# Patient Record
Sex: FEMALE | Race: BLACK OR AFRICAN AMERICAN | Employment: OTHER | URBAN - METROPOLITAN AREA
[De-identification: names, ages, dates, MRNs, and addresses within clinical notes are randomized per-mention and may not be internally consistent; named-entity substitution may affect disease eponyms.]

---

## 2017-04-12 ENCOUNTER — OFFICE VISIT (OUTPATIENT)
Dept: INTERNAL MEDICINE CLINIC | Age: 42
End: 2017-04-12

## 2017-04-12 VITALS
HEIGHT: 67 IN | SYSTOLIC BLOOD PRESSURE: 110 MMHG | WEIGHT: 188 LBS | HEART RATE: 80 BPM | RESPIRATION RATE: 16 BRPM | DIASTOLIC BLOOD PRESSURE: 82 MMHG | BODY MASS INDEX: 29.51 KG/M2

## 2017-04-12 DIAGNOSIS — F41.9 ANXIETY: Primary | ICD-10-CM

## 2017-04-12 DIAGNOSIS — R07.89 CHEST WALL PAIN: ICD-10-CM

## 2017-04-12 DIAGNOSIS — K59.00 CONSTIPATION, UNSPECIFIED CONSTIPATION TYPE: ICD-10-CM

## 2017-04-12 DIAGNOSIS — F32.9 REACTIVE DEPRESSION: ICD-10-CM

## 2017-04-12 DIAGNOSIS — D64.9 ANEMIA, UNSPECIFIED TYPE: ICD-10-CM

## 2017-04-12 LAB
BASOPHILS ABSOLUTE: 0 K/UL (ref 0–0.2)
BASOPHILS RELATIVE PERCENT: 0.8 %
EOSINOPHILS ABSOLUTE: 0.4 K/UL (ref 0–0.6)
EOSINOPHILS RELATIVE PERCENT: 6.9 %
HCT VFR BLD CALC: 31.1 % (ref 36–48)
HEMOGLOBIN: 9.6 G/DL (ref 12–16)
LYMPHOCYTES ABSOLUTE: 1.8 K/UL (ref 1–5.1)
LYMPHOCYTES RELATIVE PERCENT: 33.3 %
MCH RBC QN AUTO: 27 PG (ref 26–34)
MCHC RBC AUTO-ENTMCNC: 31 G/DL (ref 31–36)
MCV RBC AUTO: 87.2 FL (ref 80–100)
MONOCYTES ABSOLUTE: 0.4 K/UL (ref 0–1.3)
MONOCYTES RELATIVE PERCENT: 6.5 %
NEUTROPHILS ABSOLUTE: 2.9 K/UL (ref 1.7–7.7)
NEUTROPHILS RELATIVE PERCENT: 52.5 %
PDW BLD-RTO: 15.2 % (ref 12.4–15.4)
PLATELET # BLD: 276 K/UL (ref 135–450)
PMV BLD AUTO: 8.5 FL (ref 5–10.5)
RBC # BLD: 3.56 M/UL (ref 4–5.2)
WBC # BLD: 5.5 K/UL (ref 4–11)

## 2017-04-12 PROCEDURE — 99214 OFFICE O/P EST MOD 30 MIN: CPT | Performed by: INTERNAL MEDICINE

## 2017-04-12 PROCEDURE — 36415 COLL VENOUS BLD VENIPUNCTURE: CPT | Performed by: INTERNAL MEDICINE

## 2017-04-12 RX ORDER — OXYCODONE HYDROCHLORIDE AND ACETAMINOPHEN 5; 325 MG/1; MG/1
TABLET ORAL
COMMUNITY
Start: 2017-03-17 | End: 2017-04-16 | Stop reason: ALTCHOICE

## 2017-04-12 RX ORDER — MELOXICAM 15 MG/1
15 TABLET ORAL DAILY
Qty: 30 TABLET | Refills: 5 | Status: SHIPPED | OUTPATIENT
Start: 2017-04-12 | End: 2017-08-15

## 2017-04-12 ASSESSMENT — ENCOUNTER SYMPTOMS
COUGH: 0
ABDOMINAL DISTENTION: 0
CONSTIPATION: 0
ABDOMINAL PAIN: 0
SHORTNESS OF BREATH: 0

## 2017-04-13 ENCOUNTER — TELEPHONE (OUTPATIENT)
Dept: INTERNAL MEDICINE CLINIC | Age: 42
End: 2017-04-13

## 2017-04-13 DIAGNOSIS — D64.9 ANEMIA, UNSPECIFIED TYPE: Primary | ICD-10-CM

## 2017-04-17 ENCOUNTER — TELEPHONE (OUTPATIENT)
Dept: INTERNAL MEDICINE CLINIC | Age: 42
End: 2017-04-17

## 2017-04-17 DIAGNOSIS — G89.29 OTHER CHRONIC PAIN: Primary | ICD-10-CM

## 2017-04-20 ENCOUNTER — NURSE ONLY (OUTPATIENT)
Dept: INTERNAL MEDICINE CLINIC | Age: 42
End: 2017-04-20

## 2017-04-20 DIAGNOSIS — D64.9 ANEMIA, UNSPECIFIED TYPE: ICD-10-CM

## 2017-04-20 LAB
IRON SATURATION: 10 % (ref 15–50)
IRON: 44 UG/DL (ref 37–145)
TOTAL IRON BINDING CAPACITY: 437 UG/DL (ref 260–445)

## 2017-04-20 PROCEDURE — 36415 COLL VENOUS BLD VENIPUNCTURE: CPT | Performed by: INTERNAL MEDICINE

## 2017-04-26 ENCOUNTER — TELEPHONE (OUTPATIENT)
Dept: INTERNAL MEDICINE CLINIC | Age: 42
End: 2017-04-26

## 2017-06-20 RX ORDER — POLYETHYLENE GLYCOL 3350 17 G/17G
POWDER, FOR SOLUTION ORAL
Qty: 1 BOTTLE | Refills: 3 | Status: SHIPPED | OUTPATIENT
Start: 2017-06-20

## 2017-07-06 ENCOUNTER — TELEPHONE (OUTPATIENT)
Dept: INTERNAL MEDICINE CLINIC | Age: 42
End: 2017-07-06

## 2017-07-19 ENCOUNTER — NURSE ONLY (OUTPATIENT)
Dept: INTERNAL MEDICINE CLINIC | Age: 42
End: 2017-07-19

## 2017-07-19 DIAGNOSIS — D64.9 ANEMIA, UNSPECIFIED TYPE: Primary | ICD-10-CM

## 2017-07-19 LAB
HEMOCCULT STL QL: NORMAL

## 2017-07-19 PROCEDURE — 99999 PR OFFICE/OUTPT VISIT,PROCEDURE ONLY: CPT | Performed by: INTERNAL MEDICINE

## 2017-08-11 RX ORDER — VENLAFAXINE HYDROCHLORIDE 75 MG/1
CAPSULE, EXTENDED RELEASE ORAL
Qty: 30 CAPSULE | Refills: 5 | Status: SHIPPED | OUTPATIENT
Start: 2017-08-11 | End: 2017-12-07 | Stop reason: SDUPTHER

## 2017-08-22 ENCOUNTER — OFFICE VISIT (OUTPATIENT)
Dept: RHEUMATOLOGY | Age: 42
End: 2017-08-22

## 2017-08-22 VITALS
HEART RATE: 70 BPM | WEIGHT: 189 LBS | SYSTOLIC BLOOD PRESSURE: 110 MMHG | DIASTOLIC BLOOD PRESSURE: 80 MMHG | HEIGHT: 67 IN | BODY MASS INDEX: 29.66 KG/M2 | OXYGEN SATURATION: 90 %

## 2017-08-22 DIAGNOSIS — M79.7 FIBROMYALGIA: ICD-10-CM

## 2017-08-22 DIAGNOSIS — M25.50 POLYARTHRALGIA: ICD-10-CM

## 2017-08-22 DIAGNOSIS — M79.7 FIBROMYALGIA: Primary | ICD-10-CM

## 2017-08-22 DIAGNOSIS — E55.9 VITAMIN D DEFICIENCY: Primary | ICD-10-CM

## 2017-08-22 LAB
C-REACTIVE PROTEIN: 2.1 MG/L (ref 0–5.1)
RHEUMATOID FACTOR: <10 IU/ML
SEDIMENTATION RATE, ERYTHROCYTE: 15 MM/HR (ref 0–20)
TOTAL CK: 266 U/L (ref 26–192)
TSH REFLEX FT4: 2.04 UIU/ML (ref 0.27–4.2)
VITAMIN B-12: 1240 PG/ML (ref 211–911)
VITAMIN D 25-HYDROXY: 28.5 NG/ML

## 2017-08-22 PROCEDURE — 99245 OFF/OP CONSLTJ NEW/EST HI 55: CPT | Performed by: INTERNAL MEDICINE

## 2017-08-22 RX ORDER — MELOXICAM 15 MG/1
TABLET ORAL
COMMUNITY
Start: 2016-09-16 | End: 2017-08-22

## 2017-08-22 RX ORDER — METHOCARBAMOL 500 MG/1
500 TABLET, FILM COATED ORAL 3 TIMES DAILY PRN
Qty: 90 TABLET | Refills: 1 | Status: SHIPPED | OUTPATIENT
Start: 2017-08-22 | End: 2017-11-27 | Stop reason: SDUPTHER

## 2017-08-22 RX ORDER — NAPROXEN 500 MG/1
500 TABLET ORAL 2 TIMES DAILY PRN
Qty: 60 TABLET | Refills: 3 | Status: SHIPPED | OUTPATIENT
Start: 2017-08-22 | End: 2018-01-26 | Stop reason: SDUPTHER

## 2017-08-23 RX ORDER — CHOLECALCIFEROL (VITAMIN D3) 50 MCG
2000 TABLET ORAL DAILY
Qty: 30 TABLET | Refills: 11 | Status: SHIPPED | OUTPATIENT
Start: 2017-08-23 | End: 2018-04-12

## 2017-08-23 RX ORDER — ERGOCALCIFEROL 1.25 MG/1
50000 CAPSULE ORAL WEEKLY
Qty: 4 CAPSULE | Refills: 0 | Status: SHIPPED | OUTPATIENT
Start: 2017-08-23 | End: 2018-04-12

## 2017-08-24 LAB
ANA BY IFA: NORMAL
CCP IGG ANTIBODIES: 6 UNITS (ref 0–19)

## 2017-10-03 ENCOUNTER — OFFICE VISIT (OUTPATIENT)
Dept: RHEUMATOLOGY | Age: 42
End: 2017-10-03

## 2017-10-03 VITALS
SYSTOLIC BLOOD PRESSURE: 120 MMHG | BODY MASS INDEX: 28.82 KG/M2 | DIASTOLIC BLOOD PRESSURE: 80 MMHG | HEIGHT: 67 IN | WEIGHT: 183.6 LBS

## 2017-10-03 DIAGNOSIS — M25.50 POLYARTHRALGIA: ICD-10-CM

## 2017-10-03 DIAGNOSIS — M79.7 FIBROMYALGIA: Primary | ICD-10-CM

## 2017-10-03 PROCEDURE — 99214 OFFICE O/P EST MOD 30 MIN: CPT | Performed by: INTERNAL MEDICINE

## 2017-10-03 RX ORDER — GABAPENTIN 300 MG/1
CAPSULE ORAL
Qty: 90 CAPSULE | Refills: 3 | Status: SHIPPED | OUTPATIENT
Start: 2017-10-03 | End: 2017-12-12

## 2017-10-03 NOTE — MR AVS SNAPSHOT
After Visit Summary             Vandana Bowman   10/3/2017 11:40 AM   Office Visit    Description:  Female : 1975   Provider:  [place], [place]   Department:  [facility] Rheumatology              Your Follow-Up and Future Appointments         Below is a list of your follow-up and future appointments. This may not be a complete list as you may have made appointments directly with providers that we are not aware of or your providers may have made some for you. Please call your providers to confirm appointments. It is important to keep your appointments. Please bring your current insurance card, photo ID, co-pay, and all medication bottles to your appointment. If self-pay, payment is expected at the time of service. Your To-Do List     Follow-Up    Return in about 3 months (around 1/3/2018). Information from Your Visit        Department     Name Address Phone Fax    [facility]umatology [facility] [phone] [phone]      You Were Seen for:         Comments    Fibromyalgia   [[id number]]         Vital Signs     Blood Pressure Height Weight Body Mass Index Smoking Status       120/80 5' 6.5\" (1.689 m) 183 lb 9.6 oz (83.3 kg) 29.19 kg/m2 Former Smoker       Additional Information about your Body Mass Index (BMI)           Your BMI as listed above is considered overweight (25.0-29.9). BMI is an estimate of body fat, calculated from your height and weight. The higher your BMI, the greater your risk of heart disease, high blood pressure, type 2 diabetes, stroke, gallstones, arthritis, sleep apnea, and certain cancers. BMI is not perfect. It may overestimate body fat in athletes and people who are more muscular. If your body fat is high you can improve your BMI by decreasing your calorie intake and becoming more physically active.      Learn more at: [email or web address]          Instructions -     gabapentin (NEURONTIN) 300 MG capsule; Take 300 mg daily by mouth at bedtime for 4 days, then twice a day for 4 days and then 3 times a day    Strongly emphasized on low impact aerobic and stretching exercises including biking, swimming, walking, yoga or tiachi classes    Counseled on Sleep hygiene    Advised to drink 64 oz of water and limit caffeine intake to 8 oz/day   -Massage therapy                Today's Medication Changes          These changes are accurate as of: 10/3/17 12:11 PM.  If you have any questions, ask your nurse or doctor. START taking these medications           gabapentin 300 MG capsule   Commonly known as:  NEURONTIN   Instructions:   Take 300 mg daily by mouth at bedtime for 4 days, then twice a day for 4 days and then 3 times a day   Quantity:  90 capsule   Refills:  3   Started by:  Alen Garces MD            Where to Get Your Medications      These medications were sent to [address] [place] [address] [facility] [phone]  [address] [place] [address], [address] [place] [address]     Phone:  [phone]     gabapentin 300 MG capsule               Your Current Medications Are              gabapentin (NEURONTIN) 300 MG capsule Take 300 mg daily by mouth at bedtime for 4 days, then twice a day for 4 days and then 3 times a day    vitamin D (ERGOCALCIFEROL) 50250 units CAPS capsule Take 1 capsule by mouth once a week    Cholecalciferol (VITAMIN D) 2000 units TABS tablet Take 1 tablet by mouth daily After finishing 4 week couse    naproxen (NAPROSYN) 500 MG tablet Take 1 tablet by mouth 2 times daily as needed for Pain    venlafaxine (EFFEXOR XR) 75 MG extended release capsule TAKE ONE CAPSULE BY MOUTH ONCE DAILY    polyethylene glycol (GLYCOLAX) powder MIX 17 GRAMS (1 CAPFUL) WITH 8 OZ OF FAVORITE LIQUID AND TAKE  BY MOUTH DAILY    Ascorbic Acid (VITAMIN C) 500 MG tablet Take 500 mg by mouth daily

## 2017-10-03 NOTE — PROGRESS NOTES
ER multiple times for the same symptoms. Pain wakes her up frequently from sleep. Sleep is not refreshing. She has persistent fatigue, chronic headaches, brain fog, anxiety and depression. She also has pain in her joints associated with stiffness. She denies any swelling. She has difficulty opening doorknobs and jar cans. She also has pain on bottom of her feet which is worse after taking a step in the morning. She recently went to the ER and was prescribed Robaxin and naproxen which has helped alleviate some of the symptoms. She has never tried gabapentin, Elavil, Lyrica, Cymbalta or Savella. She has never tried any nonpharmacological therapy for her symptoms. Interim History: She presents for a follow-up of fibromyalgia. She continues to complain of chronic widespread musculoskeletal pain. She is on Robaxin and naproxen as needed with some improvement in symptoms. She was on Effexor in the past which helped initially then later on last its efficacy. Workup for autoimmune diseases negative. She has vitamin D deficiency and is on replacement. Blood work was reviewed with the patient. HPI  ROS    REVIEW OF SYSTEMS: positive for fatigue, weakness, chest pain, shortness of breath, swollen legs, constipation, muscle spasm, anxiety, agitation, difficulty staying sleep, anemia  Constitutional: No unanticipated weight loss or fevers. Integumentary: No rash, photosensitivity, malar rash, livedo reticularis, alopecia and Raynaud's symptoms, sclerodactyly, skin tightening  Eyes: negative for dry eyes, visual disturbance and persistent redness, discharge from eyes   ENT: - No tinnitus, loss of hearing, vertigo, or recurrent ear infections.  - No history of nasal/oral ulcers. - No history of sicca symptoms. Cardiovascular: No history of pericarditis, or murmur or palpitations  Respiratory: No cough or history of interstitial lung disease. No history of pleurisy.  No history of tuberculosis or atypical Musculoskeletal:                                            Right            Left                                   Tender Tender    Costochondral  + +   Low cervical + +   suboccipital + +   Trapezius  + +   Supraspinatus  + +   Lateral epicondyl + +   Gluteal + +   Greater trochanter  + +   knees + +     Tenderness to palpation in multiple PIPs, MCPs, wrists, elbows, shoulders, knees, ankles, MTPs  Ambulates without assistance, normal gait  Neck: Full ROM, no tenderness, supple   Upper Extremities        Shoulder: Full ROM, no crepitus        Elbow:  Full ROM, no synovitis, no deformity        Wrist: Full ROM, no synovitis, no deformity, no ulnar deviation        Fingers: No sclerodactly, no active raynaud's, no ulcers. MCPs: No synovitis, no deformity             PIPs:  No synovitis, no deformity             DIPs: No synovitis, no deformity             Nails: No pitting, no telangiectasias. Lower Extremities        Hip: Full ROM, no tenderness to palpation        Knee: Full ROM, no erythema/swelling/laxity/crepitus. Patella not ballotable. Ankle: Full ROM, no swelling or erythema        MTPs: No swelling or erythema  Back- no tenderness. Eyes:  PERRL, extra ocular movements intact, conjunctiva normal   HEENT:  Atraumatic, normocephalic, external ears normal, oropharynx moist, no pharyngeal exudates. Respiratory:  No respiratory distress  GI:  Soft, nondistended, normal bowel sounds, nontender, no organomegaly, no mass, no rebound, no guarding   :  No costovertebral angle tenderness   Integument:  Well hydrated, no rash or telangiectasias  Lymphatic:  No lymphadenopathy noted   Neurologic:   Alert & oriented x 3, CN 2-12 normal, no focal deficits noted. Sensations Intact. Muscle strength 5/5 proximally and distally in upper and lower extremities.    Psychiatric:  Speech and behavior appropriate           LABS AND IMAGING  Outside data reviewed and in HPI    Lab Results   Component Value Date    WBC 5.0 08/15/2017    RBC 4.21 08/15/2017    HGB 12.9 08/15/2017    HCT 38.5 08/15/2017     08/15/2017    MCV 91.5 08/15/2017    MCH 30.7 08/15/2017    MCHC 33.6 08/15/2017    RDW 14.4 08/15/2017    SEGSPCT 31.0 01/03/2013    LYMPHOPCT 30.6 08/15/2017    MONOPCT 5.9 08/15/2017    EOSPCT 3.1 11/20/2010    BASOPCT 1.0 08/15/2017    MONOSABS 0.3 08/15/2017    LYMPHSABS 1.5 08/15/2017    EOSABS 0.4 08/15/2017    BASOSABS 0.0 08/15/2017    DIFFTYPE Manual 01/03/2013       Chemistry        Component Value Date/Time     08/15/2017 1326    K 3.9 08/15/2017 1326     08/15/2017 1326    CO2 24 08/15/2017 1326    BUN 13 08/15/2017 1326    CREATININE 1.0 08/15/2017 1326        Component Value Date/Time    CALCIUM 9.3 08/15/2017 1326    ALKPHOS 46 08/15/2017 1326    AST 16 08/15/2017 1326    ALT 13 08/15/2017 1326    BILITOT 0.5 08/15/2017 1326          Lab Results   Component Value Date    SEDRATE 15 08/22/2017     Lab Results   Component Value Date    CRP 2.1 08/22/2017     Lab Results   Component Value Date    ANNEMARIE Negative 05/19/2015     Lab Results   Component Value Date    RF <10.0 08/22/2017    CCPABIGG 6 08/22/2017     Lab Results   Component Value Date    ANNEMARIE Negative 05/19/2015    ANAINT see below 05/19/2015       ASSESSMENT AND PLAN      Assessment/Plan:      ASSESSMENT:    1. Fibromyalgia    2. Polyarthralgia        PLAN:   Patricia Cuba was seen today for follow-up. Diagnoses and all orders for this visit:    Fibromyalgia-Continues to be symptomatic  -Workup negative for autoimmune disease/systemic inflammatory disease  -Continue Robaxin and naproxen as needed  -I will start gabapentin 300 mg per day with taper.   She is advised to take 600 mg at night if she gets drowsy with the morning and afternoon dose  -Side effects of gabapentin include drowsiness, dizziness, sedation, weight gain, dry mouth and cognitive impairment and were explained to the patient in detail  -She cannot afford aquatic therapy, not covered by the insurance  -Strongly emphasized on low impact aerobic and stretching exercises including biking, swimming, walking, yoga or tiachi classes    Counseled on Sleep hygiene    Advised to drink 64 oz of water and limit caffeine intake to 8 oz/day   -Massage therapy  -     gabapentin (NEURONTIN) 300 MG capsule; Take 300 mg daily by mouth at bedtime for 4 days, then twice a day for 4 days and then 3 times a day    Polyarthralgia-most likely secondary to fibromyalgia/chronic pain. Blood work negative for rheumatoid arthritis. Continue naproxen as needed    The patient indicates understanding of these issues and agrees with the plan. Return in about 3 months (around 1/3/2018). The risks and benefits of my recommendations, as well as other treatment options, benefits and side effects were discussed with the patient. All questions were answered. I reviewed patient's history, referral documents and electronic medical records  Copy of consult note is being routed electronically/faxed to referring physician         ######################################################################    I thank you for giving me the opportunity to participate in [facility]. If you have any questions or concerns please feel free to contact me. I look forward to following  [facility] along with you. Electronically signed by: Dejuan Stuart MD, 10/3/2017 2:36 PM    Documentation was done using voice recognition dragon software. Every effort was made to ensure accuracy; however, inadvertent unintentional computerized transcription errors may be present.

## 2017-11-21 DIAGNOSIS — R07.89 CHEST WALL PAIN: ICD-10-CM

## 2017-11-21 RX ORDER — MELOXICAM 15 MG/1
TABLET ORAL
Qty: 30 TABLET | Refills: 3 | Status: SHIPPED | OUTPATIENT
Start: 2017-11-21 | End: 2017-12-04 | Stop reason: ALTCHOICE

## 2017-11-27 DIAGNOSIS — M79.7 FIBROMYALGIA: ICD-10-CM

## 2017-11-28 RX ORDER — METHOCARBAMOL 500 MG/1
500 TABLET, FILM COATED ORAL 3 TIMES DAILY PRN
Qty: 90 TABLET | Refills: 1 | Status: SHIPPED | OUTPATIENT
Start: 2017-11-28 | End: 2018-02-08 | Stop reason: SDUPTHER

## 2017-12-05 ENCOUNTER — TELEPHONE (OUTPATIENT)
Dept: INTERNAL MEDICINE CLINIC | Age: 42
End: 2017-12-05

## 2017-12-07 ENCOUNTER — OFFICE VISIT (OUTPATIENT)
Dept: RHEUMATOLOGY | Age: 42
End: 2017-12-07

## 2017-12-07 VITALS
HEIGHT: 67 IN | DIASTOLIC BLOOD PRESSURE: 84 MMHG | BODY MASS INDEX: 28.09 KG/M2 | WEIGHT: 179 LBS | SYSTOLIC BLOOD PRESSURE: 130 MMHG

## 2017-12-07 DIAGNOSIS — M25.50 POLYARTHRALGIA: ICD-10-CM

## 2017-12-07 DIAGNOSIS — M79.7 FIBROMYALGIA: Primary | ICD-10-CM

## 2017-12-07 PROCEDURE — G8417 CALC BMI ABV UP PARAM F/U: HCPCS | Performed by: INTERNAL MEDICINE

## 2017-12-07 PROCEDURE — G8484 FLU IMMUNIZE NO ADMIN: HCPCS | Performed by: INTERNAL MEDICINE

## 2017-12-07 PROCEDURE — 1036F TOBACCO NON-USER: CPT | Performed by: INTERNAL MEDICINE

## 2017-12-07 PROCEDURE — G8427 DOCREV CUR MEDS BY ELIG CLIN: HCPCS | Performed by: INTERNAL MEDICINE

## 2017-12-07 PROCEDURE — 99214 OFFICE O/P EST MOD 30 MIN: CPT | Performed by: INTERNAL MEDICINE

## 2017-12-07 RX ORDER — PREGABALIN 75 MG/1
CAPSULE ORAL
Qty: 120 CAPSULE | Refills: 2 | Status: SHIPPED | OUTPATIENT
Start: 2017-12-07 | End: 2017-12-12

## 2017-12-07 RX ORDER — VENLAFAXINE HYDROCHLORIDE 150 MG/1
CAPSULE, EXTENDED RELEASE ORAL
Qty: 30 CAPSULE | Refills: 5 | Status: SHIPPED | OUTPATIENT
Start: 2017-12-07 | End: 2018-07-16

## 2017-12-07 NOTE — PROGRESS NOTES
2017  Patient Name: Natali Gomez  : 1975  Medical Record: [id number]    MEDICATIONS  Current Outpatient Prescriptions   Medication Sig Dispense Refill    venlafaxine (EFFEXOR XR) 150 MG extended release capsule TAKE ONE CAPSULE BY MOUTH ONCE DAILY 30 capsule 5    pregabalin (LYRICA) 75 MG capsule Take 1 tab daily at bedtime  for 1 week, then twice a day for 1 week, then 1 tab in am and 2 tabs in pm for 1 week, then 2 tabs in am and 2 tabs in pm daily. 120 capsule 2    methocarbamol (ROBAXIN) 500 MG tablet Take 1 tablet by mouth 3 times daily as needed (Muscle pain or spasm) 90 tablet 1    gabapentin (NEURONTIN) 300 MG capsule Take 300 mg daily by mouth at bedtime for 4 days, then twice a day for 4 days and then 3 times a day 90 capsule 3    vitamin D (ERGOCALCIFEROL) 08801 units CAPS capsule Take 1 capsule by mouth once a week 4 capsule 0    Cholecalciferol (VITAMIN D) 2000 units TABS tablet Take 1 tablet by mouth daily After finishing 4 week couse 30 tablet 11    naproxen (NAPROSYN) 500 MG tablet Take 1 tablet by mouth 2 times daily as needed for Pain 60 tablet 3    polyethylene glycol (GLYCOLAX) powder MIX 17 GRAMS (1 CAPFUL) WITH 8 OZ OF FAVORITE LIQUID AND TAKE  BY MOUTH DAILY 1 Bottle 3    Ascorbic Acid (VITAMIN C) 500 MG tablet Take 500 mg by mouth daily      valACYclovir (VALTREX) 500 MG tablet Take 500 mg by mouth 3 times daily as needed      dicyclomine (BENTYL) 10 MG capsule Take 1 capsule by mouth daily 30 capsule 3     No current facility-administered medications for this visit. ALLERGIES  Allergies   Allergen Reactions    Penicillins Hives         Comments  No specialty comments available. Background history:  Natali Gomez is a 40 y.o. female who is being seen for follow up evaluation of musculoskeletal pain. She is complaining of chronic widespread musculoskeletal pain involving upper and lower body on both sides of the midline.   Pain is worse around the ribs/neck. She has muscle spasms around the rib area. She describes her pain as constant, 9 out of 10, aching, with no relieving or aggravating factors. Symptoms are progressively getting worse. Her symptoms started many years ago. She has tried meloxicam, Effexor, multiple NSAIDs with no improvement in symptoms. she also has been to ER multiple times for the same symptoms. Pain wakes her up frequently from sleep. Sleep is not refreshing. She has persistent fatigue, chronic headaches, brain fog, anxiety and depression. She also has pain in her joints associated with stiffness. She denies any swelling. She has difficulty opening doorknobs and jar cans. She also has pain on bottom of her feet which is worse after taking a step in the morning. She recently went to the ER and was prescribed Robaxin and naproxen which has helped alleviate some of the symptoms. She has never tried gabapentin, Elavil, Lyrica, Cymbalta or Savella. She has never tried any nonpharmacological therapy for her symptoms. Interim History: She presents for a follow-up of fibromyalgia. She continues to complain of chronic widespread musculoskeletal pain. Symptoms have recently become worse. She has not been able to get out of the bed for past 1 week due to pain. She is on Robaxin and naproxen as needed with some improvement in symptoms. She was started on gabapentin 2 months ago and has not noticed significant improvement in the symptoms. She is on 300 mg 3 times a day. She has also felt drowsiness/dizziness on gabapentin. She is on Effexor 75 mg per day for anxiety and depression. Workup for autoimmune diseases negative. She has vitamin D deficiency and is on replacement. Blood work was reviewed with the patient. Aquatic therapy and massage therapy is not covered by the insurance.   HPI  ROS    REVIEW OF SYSTEMS: positive for fatigue, weakness, chest pain, shortness of breath, swollen legs, constipation, muscle spasm, anxiety, agitation, difficulty staying sleep, anemia  Constitutional: No unanticipated weight loss or fevers. Integumentary: No rash, photosensitivity, malar rash, livedo reticularis, alopecia and Raynaud's symptoms, sclerodactyly, skin tightening  Eyes: negative for dry eyes, visual disturbance and persistent redness, discharge from eyes   ENT: - No tinnitus, loss of hearing, vertigo, or recurrent ear infections.  - No history of nasal/oral ulcers. - No history of sicca symptoms. Cardiovascular: No history of pericarditis, or murmur or palpitations  Respiratory: No cough or history of interstitial lung disease. No history of pleurisy. No history of tuberculosis or atypical infections. Gastrointestinal: No history of heart burn, dysphagia or esophageal dysmotility. No change in bowel habits or any inflammatory bowel disease. Genitourinary: No history renal disease, miscarriages. Hematologic/Lymphatic: No abnormal bruising or bleeding, blood clots or swollen lymph nodes. Neurological: No history seizure or focal weakness. No history of neuropathies, paresthesias or hyperesthesias, facial droop, diplopia  Psychiatric: No history of bipolar disease  Endocrine: Denies any thyroid / parathyroid disease and osteoporosis  Allergic/Immunologic: No nasal congestion or hives. I have reviewed patients Past medical History, Social History and Family History as mentioned in her chart and this remains unchanged from previous. Past Medical History:   Diagnosis Date    Anxiety     Costochondritis     Depression     Fibroid, uterine     Pleurisy     [facility]     Past Surgical History:   Procedure Laterality Date     SECTION      MOUTH SURGERY N/A 2016    wisdom teeth    MYOMECTOMY       Social History     Social History    Marital status:      Spouse name: N/A    Number of children: N/A    Years of education: N/A     Occupational History    Not on file. HEENT:  Atraumatic, normocephalic, external ears normal, oropharynx moist, no pharyngeal exudates. Respiratory:  No respiratory distress  GI:  Soft, nondistended, normal bowel sounds, nontender, no organomegaly, no mass, no rebound, no guarding   :  No costovertebral angle tenderness   Integument:  Well hydrated, no rash or telangiectasias  Lymphatic:  No lymphadenopathy noted   Neurologic:   Alert & oriented x 3, CN 2-12 normal, no focal deficits noted. Sensations Intact. Muscle strength 5/5 proximally and distally in upper and lower extremities.    Psychiatric:  Speech and behavior appropriate           LABS AND IMAGING  Outside data reviewed and in HPI    Lab Results   Component Value Date    WBC 5.0 08/15/2017    RBC 4.21 08/15/2017    HGB 12.9 08/15/2017    HCT 38.5 08/15/2017     08/15/2017    MCV 91.5 08/15/2017    MCH 30.7 08/15/2017    MCHC 33.6 08/15/2017    RDW 14.4 08/15/2017    SEGSPCT 31.0 01/03/2013    LYMPHOPCT 30.6 08/15/2017    MONOPCT 5.9 08/15/2017    EOSPCT 3.1 11/20/2010    BASOPCT 1.0 08/15/2017    MONOSABS 0.3 08/15/2017    LYMPHSABS 1.5 08/15/2017    EOSABS 0.4 08/15/2017    BASOSABS 0.0 08/15/2017    DIFFTYPE Manual 01/03/2013       Chemistry        Component Value Date/Time     08/15/2017 1326    K 3.9 08/15/2017 1326     08/15/2017 1326    CO2 24 08/15/2017 1326    BUN 13 08/15/2017 1326    CREATININE 1.0 08/15/2017 1326        Component Value Date/Time    CALCIUM 9.3 08/15/2017 1326    ALKPHOS 46 08/15/2017 1326    AST 16 08/15/2017 1326    ALT 13 08/15/2017 1326    BILITOT 0.5 08/15/2017 1326          Lab Results   Component Value Date    SEDRATE 15 08/22/2017     Lab Results   Component Value Date    CRP 2.1 08/22/2017     Lab Results   Component Value Date    ANNEMARIE Negative 05/19/2015     Lab Results   Component Value Date    RF <10.0 08/22/2017    CCPABIGG 6 08/22/2017     Lab Results   Component Value Date    ANNEMARIE Negative 05/19/2015    ANAINT see below

## 2017-12-07 NOTE — PATIENT INSTRUCTIONS
Decrease gabapentin by 1 cap every 3- 4 days until off   Start lyrica once off of gabapentin   Increase effexor to 150 mg once a day

## 2017-12-08 ENCOUNTER — TELEPHONE (OUTPATIENT)
Dept: INTERNAL MEDICINE CLINIC | Age: 42
End: 2017-12-08

## 2017-12-08 NOTE — TELEPHONE ENCOUNTER
Please call with the status of the PA for Lyrica. She is aware Dr Marivel Martinez will not return the call until next week.

## 2017-12-11 ENCOUNTER — TELEPHONE (OUTPATIENT)
Dept: INTERNAL MEDICINE CLINIC | Age: 42
End: 2017-12-11

## 2017-12-11 DIAGNOSIS — M79.7 FIBROMYALGIA: ICD-10-CM

## 2017-12-12 RX ORDER — PREGABALIN 75 MG/1
75 CAPSULE ORAL 2 TIMES DAILY
Qty: 60 CAPSULE | Refills: 1 | Status: SHIPPED | OUTPATIENT
Start: 2017-12-12 | End: 2018-01-26 | Stop reason: SDUPTHER

## 2018-01-26 DIAGNOSIS — M79.7 FIBROMYALGIA: ICD-10-CM

## 2018-01-26 RX ORDER — NAPROXEN 500 MG/1
TABLET ORAL
Qty: 60 TABLET | Refills: 3 | Status: SHIPPED | OUTPATIENT
Start: 2018-01-26 | End: 2018-05-21 | Stop reason: SDUPTHER

## 2018-02-08 ENCOUNTER — TELEPHONE (OUTPATIENT)
Dept: INTERNAL MEDICINE CLINIC | Age: 43
End: 2018-02-08

## 2018-02-08 DIAGNOSIS — M79.7 FIBROMYALGIA: ICD-10-CM

## 2018-02-09 RX ORDER — METHOCARBAMOL 500 MG/1
TABLET, FILM COATED ORAL
Qty: 90 TABLET | Refills: 1 | Status: SHIPPED | OUTPATIENT
Start: 2018-02-09 | End: 2018-04-16 | Stop reason: SDUPTHER

## 2018-04-12 ENCOUNTER — OFFICE VISIT (OUTPATIENT)
Dept: RHEUMATOLOGY | Age: 43
End: 2018-04-12

## 2018-04-12 ENCOUNTER — TELEPHONE (OUTPATIENT)
Dept: INTERNAL MEDICINE CLINIC | Age: 43
End: 2018-04-12

## 2018-04-12 VITALS
DIASTOLIC BLOOD PRESSURE: 61 MMHG | OXYGEN SATURATION: 100 % | HEIGHT: 67 IN | HEART RATE: 82 BPM | BODY MASS INDEX: 29.22 KG/M2 | WEIGHT: 186.2 LBS | SYSTOLIC BLOOD PRESSURE: 137 MMHG

## 2018-04-12 DIAGNOSIS — M79.7 FIBROMYALGIA: ICD-10-CM

## 2018-04-12 DIAGNOSIS — M25.50 POLYARTHRALGIA: ICD-10-CM

## 2018-04-12 DIAGNOSIS — M79.7 FIBROMYALGIA: Primary | ICD-10-CM

## 2018-04-12 DIAGNOSIS — R22.9 SUBCUTANEOUS NODULES: ICD-10-CM

## 2018-04-12 LAB
A/G RATIO: 1.7 (ref 1.1–2.2)
ALBUMIN SERPL-MCNC: 4.5 G/DL (ref 3.4–5)
ALP BLD-CCNC: 49 U/L (ref 40–129)
ALT SERPL-CCNC: 15 U/L (ref 10–40)
ANION GAP SERPL CALCULATED.3IONS-SCNC: 15 MMOL/L (ref 3–16)
AST SERPL-CCNC: 20 U/L (ref 15–37)
BASOPHILS ABSOLUTE: 0 K/UL (ref 0–0.2)
BASOPHILS RELATIVE PERCENT: 0.8 %
BILIRUB SERPL-MCNC: 0.5 MG/DL (ref 0–1)
BUN BLDV-MCNC: 22 MG/DL (ref 7–20)
CALCIUM SERPL-MCNC: 9.2 MG/DL (ref 8.3–10.6)
CHLORIDE BLD-SCNC: 102 MMOL/L (ref 99–110)
CO2: 24 MMOL/L (ref 21–32)
CREAT SERPL-MCNC: 1 MG/DL (ref 0.6–1.1)
EOSINOPHILS ABSOLUTE: 0.3 K/UL (ref 0–0.6)
EOSINOPHILS RELATIVE PERCENT: 6 %
GFR AFRICAN AMERICAN: >60
GFR NON-AFRICAN AMERICAN: >60
GLOBULIN: 2.6 G/DL
GLUCOSE BLD-MCNC: 85 MG/DL (ref 70–99)
HCT VFR BLD CALC: 37.8 % (ref 36–48)
HEMOGLOBIN: 12.8 G/DL (ref 12–16)
LYMPHOCYTES ABSOLUTE: 1.4 K/UL (ref 1–5.1)
LYMPHOCYTES RELATIVE PERCENT: 23.3 %
MCH RBC QN AUTO: 32.2 PG (ref 26–34)
MCHC RBC AUTO-ENTMCNC: 33.9 G/DL (ref 31–36)
MCV RBC AUTO: 95.1 FL (ref 80–100)
MONOCYTES ABSOLUTE: 0.3 K/UL (ref 0–1.3)
MONOCYTES RELATIVE PERCENT: 5.1 %
NEUTROPHILS ABSOLUTE: 3.8 K/UL (ref 1.7–7.7)
NEUTROPHILS RELATIVE PERCENT: 64.8 %
PDW BLD-RTO: 13.3 % (ref 12.4–15.4)
PLATELET # BLD: 237 K/UL (ref 135–450)
PMV BLD AUTO: 8.9 FL (ref 5–10.5)
POTASSIUM SERPL-SCNC: 4.5 MMOL/L (ref 3.5–5.1)
RBC # BLD: 3.98 M/UL (ref 4–5.2)
SODIUM BLD-SCNC: 141 MMOL/L (ref 136–145)
TOTAL PROTEIN: 7.1 G/DL (ref 6.4–8.2)
WBC # BLD: 5.8 K/UL (ref 4–11)

## 2018-04-12 PROCEDURE — 1036F TOBACCO NON-USER: CPT | Performed by: INTERNAL MEDICINE

## 2018-04-12 PROCEDURE — 99214 OFFICE O/P EST MOD 30 MIN: CPT | Performed by: INTERNAL MEDICINE

## 2018-04-12 PROCEDURE — G8417 CALC BMI ABV UP PARAM F/U: HCPCS | Performed by: INTERNAL MEDICINE

## 2018-04-12 PROCEDURE — G8427 DOCREV CUR MEDS BY ELIG CLIN: HCPCS | Performed by: INTERNAL MEDICINE

## 2018-04-12 RX ORDER — PREGABALIN 150 MG/1
CAPSULE ORAL
Qty: 60 CAPSULE | Refills: 2 | Status: SHIPPED | OUTPATIENT
Start: 2018-04-12 | End: 2018-07-23 | Stop reason: SDUPTHER

## 2018-04-13 ENCOUNTER — TELEPHONE (OUTPATIENT)
Dept: INTERNAL MEDICINE CLINIC | Age: 43
End: 2018-04-13

## 2018-04-16 DIAGNOSIS — M79.7 FIBROMYALGIA: ICD-10-CM

## 2018-04-16 RX ORDER — METHOCARBAMOL 500 MG/1
TABLET, FILM COATED ORAL
Qty: 90 TABLET | Refills: 1 | Status: SHIPPED | OUTPATIENT
Start: 2018-04-16 | End: 2018-06-28 | Stop reason: SDUPTHER

## 2018-05-21 DIAGNOSIS — M79.7 FIBROMYALGIA: ICD-10-CM

## 2018-05-24 RX ORDER — NAPROXEN 500 MG/1
TABLET ORAL
Qty: 60 TABLET | Refills: 3 | Status: SHIPPED | OUTPATIENT
Start: 2018-05-24 | End: 2018-07-16

## 2018-06-11 ENCOUNTER — OFFICE VISIT (OUTPATIENT)
Dept: INTERNAL MEDICINE CLINIC | Age: 43
End: 2018-06-11

## 2018-06-11 VITALS
OXYGEN SATURATION: 99 % | BODY MASS INDEX: 27.78 KG/M2 | HEIGHT: 67 IN | RESPIRATION RATE: 16 BRPM | HEART RATE: 87 BPM | WEIGHT: 177 LBS | DIASTOLIC BLOOD PRESSURE: 80 MMHG | SYSTOLIC BLOOD PRESSURE: 110 MMHG

## 2018-06-11 DIAGNOSIS — Z00.00 WELL WOMAN EXAM (NO GYNECOLOGICAL EXAM): Primary | ICD-10-CM

## 2018-06-11 DIAGNOSIS — M79.7 FIBROMYALGIA: ICD-10-CM

## 2018-06-11 DIAGNOSIS — R10.13 EPIGASTRIC PAIN: ICD-10-CM

## 2018-06-11 DIAGNOSIS — K59.04 CHRONIC IDIOPATHIC CONSTIPATION: ICD-10-CM

## 2018-06-11 DIAGNOSIS — R07.89 CHEST WALL PAIN: ICD-10-CM

## 2018-06-11 DIAGNOSIS — D17.9 LIPOMA, UNSPECIFIED SITE: ICD-10-CM

## 2018-06-11 LAB
ANION GAP SERPL CALCULATED.3IONS-SCNC: 14 MMOL/L (ref 3–16)
BASOPHILS ABSOLUTE: 0 K/UL (ref 0–0.2)
BASOPHILS RELATIVE PERCENT: 0.8 %
BUN BLDV-MCNC: 18 MG/DL (ref 7–20)
CALCIUM SERPL-MCNC: 9.5 MG/DL (ref 8.3–10.6)
CHLORIDE BLD-SCNC: 103 MMOL/L (ref 99–110)
CHOLESTEROL, TOTAL: 191 MG/DL (ref 0–199)
CO2: 24 MMOL/L (ref 21–32)
CREAT SERPL-MCNC: 0.9 MG/DL (ref 0.6–1.1)
EOSINOPHILS ABSOLUTE: 0.3 K/UL (ref 0–0.6)
EOSINOPHILS RELATIVE PERCENT: 4.3 %
GFR AFRICAN AMERICAN: >60
GFR NON-AFRICAN AMERICAN: >60
GLUCOSE BLD-MCNC: 84 MG/DL (ref 70–99)
HCT VFR BLD CALC: 37.1 % (ref 36–48)
HDLC SERPL-MCNC: 55 MG/DL (ref 40–60)
HEMOGLOBIN: 12.5 G/DL (ref 12–16)
LDL CHOLESTEROL CALCULATED: 114 MG/DL
LYMPHOCYTES ABSOLUTE: 1.8 K/UL (ref 1–5.1)
LYMPHOCYTES RELATIVE PERCENT: 29.9 %
MCH RBC QN AUTO: 31.8 PG (ref 26–34)
MCHC RBC AUTO-ENTMCNC: 33.7 G/DL (ref 31–36)
MCV RBC AUTO: 94.3 FL (ref 80–100)
MONOCYTES ABSOLUTE: 0.4 K/UL (ref 0–1.3)
MONOCYTES RELATIVE PERCENT: 6 %
NEUTROPHILS ABSOLUTE: 3.5 K/UL (ref 1.7–7.7)
NEUTROPHILS RELATIVE PERCENT: 59 %
PDW BLD-RTO: 13.6 % (ref 12.4–15.4)
PLATELET # BLD: 267 K/UL (ref 135–450)
PMV BLD AUTO: 9.1 FL (ref 5–10.5)
POTASSIUM SERPL-SCNC: 3.9 MMOL/L (ref 3.5–5.1)
RBC # BLD: 3.94 M/UL (ref 4–5.2)
SODIUM BLD-SCNC: 141 MMOL/L (ref 136–145)
TRIGL SERPL-MCNC: 108 MG/DL (ref 0–150)
VLDLC SERPL CALC-MCNC: 22 MG/DL
WBC # BLD: 6 K/UL (ref 4–11)

## 2018-06-11 PROCEDURE — 36415 COLL VENOUS BLD VENIPUNCTURE: CPT | Performed by: INTERNAL MEDICINE

## 2018-06-11 PROCEDURE — 99396 PREV VISIT EST AGE 40-64: CPT | Performed by: INTERNAL MEDICINE

## 2018-06-11 ASSESSMENT — ENCOUNTER SYMPTOMS
ALLERGIC/IMMUNOLOGIC NEGATIVE: 1
CONSTIPATION: 1
COUGH: 0
RESPIRATORY NEGATIVE: 1
EYES NEGATIVE: 1
CHEST TIGHTNESS: 0
SHORTNESS OF BREATH: 0

## 2018-06-18 ENCOUNTER — INITIAL CONSULT (OUTPATIENT)
Dept: SURGERY | Age: 43
End: 2018-06-18

## 2018-06-18 VITALS
SYSTOLIC BLOOD PRESSURE: 107 MMHG | HEART RATE: 77 BPM | WEIGHT: 175 LBS | DIASTOLIC BLOOD PRESSURE: 72 MMHG | BODY MASS INDEX: 27.82 KG/M2

## 2018-06-18 DIAGNOSIS — D17.21 LIPOMA OF RIGHT UPPER EXTREMITY: ICD-10-CM

## 2018-06-18 DIAGNOSIS — D17.24 LIPOMA OF LEFT THIGH: ICD-10-CM

## 2018-06-18 DIAGNOSIS — D17.1 LIPOMA OF TORSO: ICD-10-CM

## 2018-06-18 DIAGNOSIS — D17.22 LIPOMA OF LEFT UPPER EXTREMITY: Primary | ICD-10-CM

## 2018-06-18 DIAGNOSIS — D17.1 LIPOMA OF LOWER BACK: ICD-10-CM

## 2018-06-18 DIAGNOSIS — D17.23 LIPOMA OF RIGHT THIGH: ICD-10-CM

## 2018-06-18 PROCEDURE — 99242 OFF/OP CONSLTJ NEW/EST SF 20: CPT | Performed by: SURGERY

## 2018-06-18 PROCEDURE — G8427 DOCREV CUR MEDS BY ELIG CLIN: HCPCS | Performed by: SURGERY

## 2018-06-18 PROCEDURE — G8417 CALC BMI ABV UP PARAM F/U: HCPCS | Performed by: SURGERY

## 2018-06-26 ENCOUNTER — HOSPITAL ENCOUNTER (OUTPATIENT)
Dept: PHYSICAL THERAPY | Age: 43
Discharge: HOME | End: 2018-06-30
Admitting: INTERNAL MEDICINE

## 2018-06-26 ENCOUNTER — HOSPITAL ENCOUNTER (OUTPATIENT)
Dept: OTHER | Age: 43
Discharge: HOME | End: 2018-07-03
Attending: INTERNAL MEDICINE | Admitting: INTERNAL MEDICINE

## 2018-06-26 ASSESSMENT — PAIN SCALES - QUEBEC BACK PAIN DISABILITY SCALE
QUEBEC DISABILITY INDEX: 40-59%
CARRY TWO BAGS OF GROCERIES: 4
MOVE A CHAIR: 3
MAKE YOUR BED: 4
RIDE IN A CAR: 1
SIT IN A CHAIR FOR SEVERAL HOURS: 2
TAKE FOOD OUT OF THE REFRIGERATOR: 2
RUN ONE BLOCK OR 100M: 4
PUT ON SOCKS OR PANYHOSE: 3
TOTAL SCORE: 54
GET OUT OF BED: 2
REACH UP TO HIGH SHELVES: 1
WALK A FEW BLOCKS OR 300 TO 400M: 2
SLEEP THROUGH THE NIGHT: 4
STAND UP FOR 20 TO 30 MINUTES: 2
THROW A BALL: 2
QUEBEC CMS MODIFIER: CK
TURN OVER IN BED: 2
CLIMB ONE FLIGHT OF STAIRS: 1
PULL OR PUSH HEAVY DOORS: 4
LIFT AND CARRY A HEAVY SUITCASE: 5
WALK SEVERAL KILOMETERS  OR MILES: 2
BEND OVER TO CLEAN THE BATHTUB: 4

## 2018-06-26 NOTE — FLOWSHEET NOTE
was educated on PT POC, Diagnosis, Prognosis, pathomechanics as well as frequency and duration of scheduling future physical therapy appointments. Time was also taken on this day to answer all patient questions and participation in PT. Reviewed appointment policy in detail with patient and patient verbalized understanding. Pool tour and info issued. [facility] pt on activity pacing and modification to control s/s flare ups. Home Exercise Program:     Patient verbalized/demonstrated understanding and was issued written handout.     Timed Code Treatment Minutes:  25    Total Treatment Minutes: 40     Treatment/Activity Tolerance:  [] Patient tolerated treatment well [] Patient limited by fatigue  [x] Patient limited by pain  [] Patient limited by other medical complications  [] Other:     Prognosis: [x] Good [] Fair  [] Poor    Goals:    Short term goals  Time Frame for Short term goals: 2 wks  Short term goal 1: pain improvement 20-30% per pt   Short term goal 2: B UE and LE ROM/flex WNL min - no pain   Short term goal 3: pt devin 45 min pool ex       Long term goals  Time Frame for Long term goals : 4 wks  Long term goal 1: pain improvement 40-50% per pt for ease with general mobility   Long term goal 2: lumbar ROM WNL min - no pain for ease with self care   Long term goal 3: B UE/LE 5/5 and pt independent with pool hep for cont with 30 day FC trial      Patient Requires Follow-up: [x] Yes  [] No    Plan:   [] Continue per plan of care [] Alter current plan (see comments)  [x] Plan of care initiated [] Hold pending MD visit [] Discharge    Plan for Next Session:  Ronnie  Electronically signed by:  Janice Carmona, [id number] [address]

## 2018-06-28 ENCOUNTER — TELEPHONE (OUTPATIENT)
Dept: INTERNAL MEDICINE CLINIC | Age: 43
End: 2018-06-28

## 2018-06-28 ENCOUNTER — TELEPHONE (OUTPATIENT)
Dept: SURGERY | Age: 43
End: 2018-06-28

## 2018-06-28 ENCOUNTER — HOSPITAL ENCOUNTER (OUTPATIENT)
Dept: PHYSICAL THERAPY | Age: 43
Discharge: HOME | End: 2018-06-29
Admitting: INTERNAL MEDICINE

## 2018-06-28 DIAGNOSIS — M79.7 FIBROMYALGIA: ICD-10-CM

## 2018-06-28 RX ORDER — METHOCARBAMOL 500 MG/1
TABLET, FILM COATED ORAL
Qty: 90 TABLET | Refills: 5 | Status: SHIPPED | OUTPATIENT
Start: 2018-06-28 | End: 2018-12-26 | Stop reason: SDUPTHER

## 2018-06-28 NOTE — FLOWSHEET NOTE
current plan (see comments)  [] Plan of care initiated [] Hold pending MD visit [] Discharge    Plan for Next Session:  Increase gt, increase seated ex.      Electronically signed by: Angelica Rojas,

## 2018-07-01 ENCOUNTER — HOSPITAL ENCOUNTER (OUTPATIENT)
Dept: OTHER | Age: 43
Discharge: HOME | End: 2018-07-31
Attending: INTERNAL MEDICINE | Admitting: INTERNAL MEDICINE

## 2018-07-02 ENCOUNTER — PROCEDURE VISIT (OUTPATIENT)
Dept: SURGERY | Age: 43
End: 2018-07-02

## 2018-07-02 VITALS
HEART RATE: 73 BPM | WEIGHT: 175 LBS | DIASTOLIC BLOOD PRESSURE: 77 MMHG | BODY MASS INDEX: 27.82 KG/M2 | SYSTOLIC BLOOD PRESSURE: 117 MMHG

## 2018-07-02 DIAGNOSIS — D17.1 LIPOMA OF TORSO: ICD-10-CM

## 2018-07-02 DIAGNOSIS — R22.41 KNEE MASS, RIGHT: ICD-10-CM

## 2018-07-02 DIAGNOSIS — D17.1 LIPOMA OF LOWER BACK: ICD-10-CM

## 2018-07-02 DIAGNOSIS — R10.13 EPIGASTRIC ABDOMINAL PAIN: Primary | ICD-10-CM

## 2018-07-02 DIAGNOSIS — D17.21 LIPOMA OF RIGHT UPPER EXTREMITY: ICD-10-CM

## 2018-07-02 DIAGNOSIS — D17.22 LIPOMA OF LEFT UPPER EXTREMITY: ICD-10-CM

## 2018-07-02 DIAGNOSIS — D17.24 LIPOMA OF LEFT THIGH: ICD-10-CM

## 2018-07-02 DIAGNOSIS — D17.23 LIPOMA OF RIGHT THIGH: ICD-10-CM

## 2018-07-02 PROCEDURE — G8417 CALC BMI ABV UP PARAM F/U: HCPCS | Performed by: SURGERY

## 2018-07-02 PROCEDURE — 99214 OFFICE O/P EST MOD 30 MIN: CPT | Performed by: SURGERY

## 2018-07-02 PROCEDURE — G8427 DOCREV CUR MEDS BY ELIG CLIN: HCPCS | Performed by: SURGERY

## 2018-07-02 PROCEDURE — 1036F TOBACCO NON-USER: CPT | Performed by: SURGERY

## 2018-07-03 ENCOUNTER — HOSPITAL ENCOUNTER (OUTPATIENT)
Dept: PHYSICAL THERAPY | Age: 43
Discharge: HOME | End: 2018-07-04
Admitting: INTERNAL MEDICINE

## 2018-07-03 NOTE — FLOWSHEET NOTE
5     Punching    Stretching: B  30 sec  Rowing 5   Gastroc/Soleus  2 Elbow Flex/Ext 5   Hamstring  2 Shldr Flex/Ext  5   Knee flex stretch   Shldr Abd/Add 5   Piriformis   Horiz Abd/Add  5   Hip flexor    Arm Circles  5   SKTC   PNF Diagonals    DKTC  UE oscillations f/b, s/s    ITB   Wall Push-ups    Quad  Figure 8's    Mid back   Buoy pull/push downs    UT  Tband rows    Rhom  Tband lats    Post Shoulder  Lumbar Rot w/ paddles    Pec   Small ball pull downs                   diagonals             Cervical:       AROM Flex       AROM Extension     LEs exercises:  B AROM Side Bending    Marching 10 AROM Rotation    Heel Raises 10 Chin tucks    Toe Raises 10 Chin nods     Squats 10      Hamstring Curls 10      Hip Flexion 10 Balance:      Hip Abduction 10 SLS    Hip Circles 10 Tandem stance    TA set 10 NBOS eyes open    Glut Set 10 NBOS eyes closed    Hip Extension  Hand to Opposite Knee  X    Trial agg abdomen   Hip Adduction    Box Step     Hip IR   Noodle Stance     Hip ER  Stop/Go Gait    Fig 8's  Switch Gait                Seated: B Functional:    Ankle Pumps 10 Step up forward    Ankle circles 10 Step up lateral    Knee flex & ext 10 Step down    Hip Abd & Adduction 10 [place] squats    Bicycle  10 Crate Lifts    Add Set with ball 5 Lunges forward    LX stab with med ball throws  Lunges lateral    Ankle INV  Lunges retro    Ankle EV  Lower ab curl with noodle      Upper ab curl with ball      Med ball straight lifts      Med ball diagonal lifts      Hydrorider          Noodle:      Leg Press  Deep Water:    Noodle hang at wall  Jog    Noodle hang deep water  Jumping Jacks    Noodle Bicycle  Heel to toe      Hand to opposite knee      Cross country skier      Rocking Horse        Timed Code Treatment Minutes:  45    Total Treatment Minutes:  45    Treatment/Activity Tolerance:  [x] Patient tolerated treatment well [] Patient limited by fatique  [] Patient limited by pain  [] Patient limited by other medical complications  [x] Other decreased pain after pool ex 0/10     Prognosis: [x] Good [] Fair  [] Poor    Patient Requires Follow-up: [] Yes  [] No    Plan:   [x] Continue per plan of care [] Alter current plan (see comments)  [] Plan of care initiated [] Hold pending MD visit [] Discharge    Plan for Next Session:  Add lateral gt, increase seated ex x 15, squats as devin.      Electronically signed by: Kristen Jacques,

## 2018-07-05 ENCOUNTER — HOSPITAL ENCOUNTER (OUTPATIENT)
Dept: PHYSICAL THERAPY | Age: 43
Discharge: HOME | End: 2018-07-06
Admitting: INTERNAL MEDICINE

## 2018-07-05 ENCOUNTER — HOSPITAL ENCOUNTER (OUTPATIENT)
Dept: VASCULAR LAB | Age: 43
Discharge: HOME | End: 2018-07-05
Attending: SURGERY | Admitting: SURGERY

## 2018-07-05 DIAGNOSIS — R10.13 EPIGASTRIC PAIN: ICD-10-CM

## 2018-07-05 NOTE — FLOWSHEET NOTE
Physical Therapy Aquatic Flow Sheet   Date:  2018    Patient Name:  Trena Devries. :  1975     Restrictions/Precautions: Position Activity Restriction  Other position/activity restrictions: no fall risk     Pertinent Medical History:       Medical/Treatment Diagnosis Information:  · Diagnosis: Fibromyalgia   · Treatment Diagnosis: pain throughout body, dec ROM, dec strength     Insurance/Certification information:  PT Insurance Information: brandon  Physician Information:  Referring Practitioner: Dr. Steph Lange of care signed (Y/N): Faxed at eval      Visit# / total visits:  3/8 - G code at 10  Pain level:      4/10            Progress Note: []  Yes  [x]  No  Next due by: Visit #10:      History of Injury: Subjective  Subjective: Pt was dx with fibromyalgia 2017. Pain is centered around ribs and up to shoulders/UE and down to low back and into legs occassionally. Pt limited with standing tolerance, sitting/driving devin and amb devin are severely limited. Pain up to -8/10 and even limited with type of clothes she can wear. She can't tolerate constrictive clothing. To have sugery 18 to remove benign tumors that are sitting on nerves. Also c/o edema off/on. Subjective:  Pool is going well. Pain is mainly in ribs/chest/abdomen.      Objective:  Observation:  See eval  Test measurements:      Key  B= Belt DB= Dumbells T= Theratube   G=Gloves N= Noodles W= Weights   P= Paddles WW=Water Walker K= Kickboard        Transfers:   steps ind      % Immersion: 75%            Ambulation:  laps ind Exercises UE:  B    Forwards 5 Shoulder Shrugs 5    Lateral 2 Shoulder circles 5    Marching    Scapular Retraction  5    Retro   Rolling  5    [place]  Push Downs 5     IR/ER 5     Punching 5   Stretching: B  30 sec  Rowing 5   Gastroc/Soleus  2 Elbow Flex/Ext 5   Hamstring  2 Shldr Flex/Ext  5   Knee flex stretch   Shldr Abd/Add 5   Piriformis   Horiz Abd/Add  5   Hip flexor    Arm Circles (see comments)  [] Plan of care initiated [] Hold pending MD visit [] Discharge    Plan for Next Session: add step up fwd.      Electronically signed by: Jose L Tyler, [id number] Girish Cardenas

## 2018-07-09 ENCOUNTER — PAT TELEPHONE (OUTPATIENT)
Dept: PREADMISSION TESTING | Age: 43
End: 2018-07-09

## 2018-07-09 VITALS — BODY MASS INDEX: 27.62 KG/M2 | WEIGHT: 176 LBS | HEIGHT: 67 IN

## 2018-07-09 RX ORDER — CEPHRADINE 500 MG
CAPSULE ORAL
COMMUNITY
End: 2020-02-10

## 2018-07-09 RX ORDER — ONDANSETRON 4 MG/1
4 TABLET, FILM COATED ORAL EVERY 8 HOURS PRN
COMMUNITY
End: 2018-11-12

## 2018-07-09 RX ORDER — FERROUS SULFATE 325(65) MG
325 TABLET ORAL
COMMUNITY

## 2018-07-09 RX ORDER — VITAMIN E 268 MG
400 CAPSULE ORAL DAILY
COMMUNITY
End: 2018-11-12

## 2018-07-09 RX ORDER — MAGNESIUM OXIDE 400 MG/1
400 TABLET ORAL DAILY
COMMUNITY
End: 2020-02-10

## 2018-07-09 RX ORDER — ACETAMINOPHEN 160 MG
TABLET,DISINTEGRATING ORAL
COMMUNITY
End: 2018-09-04

## 2018-07-09 NOTE — PROGRESS NOTES
you have a living will and a durable power of  for healthcare, please bring in a copy. As part of our patient safety program to minimize surgical site infections, we ask you to do the following:    · Please notify your surgeon if you develop any illness between         now and the  day of your surgery. · This includes a cough, cold, fever, sore throat, nausea,         or vomiting, and diarrhea, etc.  ·  Please notify your surgeon if you experience dizziness, shortness         of breath or blurred vision between now and the time of your surgery. You may shower the night before surgery or the morning of   your surgery with an antibacterial soap. You will need to bring a photo ID and insurance card    [facility] has an onsite pharmacy, would you like to utilize our pharmacy     If you will be staying overnight and use a C-pap machine, please bring   your C-pap to hospital     Our goal is to provide you with excellent care, therefore, visitors will be limited to two(2) in the room at a time so that we may focus on providing this care for you. Please contact pre-admission testing if you have any further questions. [facility] phone number:  [phone]  Please note these are generalized instructions for all surgical cases, you may be provided with more specific instructions according to your surgery.

## 2018-07-10 ENCOUNTER — HOSPITAL ENCOUNTER (OUTPATIENT)
Dept: PHYSICAL THERAPY | Age: 43
Discharge: HOME | End: 2018-07-11
Admitting: INTERNAL MEDICINE

## 2018-07-12 ENCOUNTER — HOSPITAL ENCOUNTER (OUTPATIENT)
Dept: PHYSICAL THERAPY | Age: 43
Discharge: HOME | End: 2018-07-13
Admitting: INTERNAL MEDICINE

## 2018-07-12 ENCOUNTER — HOSPITAL ENCOUNTER (OUTPATIENT)
Dept: ENDOSCOPY | Age: 43
Discharge: HOME | End: 2018-07-12
Attending: INTERNAL MEDICINE | Admitting: INTERNAL MEDICINE

## 2018-07-12 VITALS
DIASTOLIC BLOOD PRESSURE: 84 MMHG | BODY MASS INDEX: 28.5 KG/M2 | HEIGHT: 67 IN | OXYGEN SATURATION: 100 % | HEART RATE: 79 BPM | RESPIRATION RATE: 18 BRPM | TEMPERATURE: 97.8 F | WEIGHT: 181.6 LBS | SYSTOLIC BLOOD PRESSURE: 123 MMHG

## 2018-07-12 DIAGNOSIS — R11.0 NAUSEA: ICD-10-CM

## 2018-07-12 LAB — PREGNANCY, URINE: NEGATIVE

## 2018-07-12 RX ORDER — SODIUM CHLORIDE 0.9 % (FLUSH) 0.9 %
10 SYRINGE (ML) INJECTION PRN
Status: DISCONTINUED | OUTPATIENT
Start: 2018-07-12 | End: 2018-07-13 | Stop reason: HOSPADM

## 2018-07-12 RX ORDER — SODIUM CHLORIDE 0.9 % (FLUSH) 0.9 %
10 SYRINGE (ML) INJECTION EVERY 12 HOURS SCHEDULED
Status: DISCONTINUED | OUTPATIENT
Start: 2018-07-12 | End: 2018-07-13 | Stop reason: HOSPADM

## 2018-07-12 RX ORDER — SODIUM CHLORIDE 9 MG/ML
INJECTION, SOLUTION INTRAVENOUS CONTINUOUS
Status: DISCONTINUED | OUTPATIENT
Start: 2018-07-12 | End: 2018-07-13 | Stop reason: HOSPADM

## 2018-07-12 RX ADMIN — SODIUM CHLORIDE: 9 INJECTION, SOLUTION INTRAVENOUS at 12:26

## 2018-07-12 ASSESSMENT — PAIN SCALES - WONG BAKER: WONGBAKER_NUMERICALRESPONSE: 0

## 2018-07-12 ASSESSMENT — PAIN SCALES - GENERAL
PAINLEVEL_OUTOF10: 0
PAINLEVEL_OUTOF10: 0

## 2018-07-12 ASSESSMENT — PAIN - FUNCTIONAL ASSESSMENT: PAIN_FUNCTIONAL_ASSESSMENT: 0-10

## 2018-07-12 NOTE — ANESTHESIA PRE-OP
ANESTHESIA PRE-OPERATIVE EVALUATION    Patient Name: Lupis Head YOB: 1975   Sex: Female Age: 40 yrs     Medical Record Number: [id number] Acct Number: [de-identified]     Date of Procedure: 18      Preoperative Diagnosis: NAUSEA, EPIGASTRIC PAIN/ MOLNA    Procedure: ESOPHAGOGASTRODUODENOSCOPY    Surgeon: Lorraine Martinez    HPI: This is a 41 y/o female with a c/o AP/CP  and nausea for several months. No dysphagia/vomiting. Allergies:    Penicillins Hives     NPO:  >8 hrs       VS: BP: 136/70  Pulse: 70 Resp: 18  SpO2: 100 Temp: 97.2 °F (36.2 °C) Height: 5' 6.5\" (1.689 m)  (18)  Weight: 181 lb 9.6 oz (82.4 kg)  (18) BMI: 28.9    CV:   no HTN    no HLD    No known CAD    Denies SOB/LEE/Palpitations/Syncope   PULMONARY: no  Asthma    no COPD   no YOON   ENDOCRINE: no DM     no Thyroid Disease   GI: See HPi   :   H/O Fibroid utereus   NEURO/PSYCH: no CVA   no Seizure Disorder  +Fibromyalgia- chronic back pain and paresthesias of both feet/legs   MUSCULOSKELETAL: no DJD   HEME/ONC: no DVT  no PE   +Sickle Cell Trait- no h/o crises;   No  Bleeding/Bruising issues        Past Medical History   Anxiety     Chest wall pain     Constipation     Costochondritis     Depression     Fibroid, uterine     Fibromyalgia     Pleurisy     Sickle cell trait ([facility] [address] 2016     Active Problem List   Fibromyalgia 2018    Fibroid uterus     Constipation 2016    [facility]    Anemia 2016    Chest wall pain 2016    Anxiety 2016    Reactive depression 2016     Surgical History:     SECTION      MOUTH SURGERY N/A 2016    wisdom teeth    MYOMECTOMY       Social History:    Smoking status: Former Smoker     Packs/day: 0.25     Years: 15.00     Quit date: 2012    Smokeless tobacco: Never Used    Alcohol use Yes      Comment: social    Drug use: No     The medical history was obtained from the patient & the medical records. The nursing notes, primary physician's notes, and old charts (if applicable) were reviewed. Basic Metabolic Profile    06/28/2018    K 3.8 06/28/2018     06/28/2018    CO2 25 06/28/2018    BUN 15 06/28/2018    CREATININE 0.8 06/28/2018    GLUCOSE 85 06/28/2018     Complete Blood Count   WBC 6.2 06/28/2018    HGB 12.1 06/28/2018    HCT 35.4 (L) 06/28/2018     06/28/2018     Pregnancy Test:   neg   PREGTESTUR Negative 06/28/2018     Medications:    ALPHA-LIPOIC ACID 200 MG CAPS    Take by mouth    CHOLECALCIFEROL (VITAMIN D3) 2000 UNITS CAPS    Take by mouth    FERROUS SULFATE 325 (65 FE) MG TABLET    Take 325 mg by mouth daily (with breakfast)    LACTOBACILLUS (PROBIOTIC ACIDOPHILUS PO)    Take by mouth    MAGNESIUM OXIDE (MAG-OX) 400 MG TABLET    Take 400 mg by mouth daily    METHOCARBAMOL (ROBAXIN) 500 MG TABLET    TAKE ONE TABLET BY MOUTH THREE TIMES DAILY AS NEEDED FOR MUSCLE PAIN OR SPASMS    NAPROXEN (NAPROSYN) 500 MG TABLET    TAKE ONE TAB TWICE DAILY AS NEEDED FOR PAIN    ONDANSETRON (ZOFRAN) 4 MG TABLET    Take 4 mg  8 hours as needed for Nausea or Vomiting    POLYETHYLENE GLYCOL (GLYCOLAX) POWDER    MIX 17 GRAMS (1 CAPFUL) WITH 8 OZ OF FAVORITE LIQUID AND TAKE  BY MOUTH DAILY    PREGABALIN (LYRICA) 150 MG CAPSULE    TAKE ONE CAPSULE BY MOUTH TWICE DAILY. VALACYCLOVIR (VALTREX) 500 MG TABLET    Take 500 mg by mouth 3 times daily as needed    VENLAFAXINE (EFFEXOR XR) 150 MG EXTENDED RELEASE CAPSULE    TAKE ONE CAPSULE BY MOUTH ONCE DAILY    VITAMIN E 400 UNIT CAPSULE    Take 400 Units by mouth daily     Exercise Tolerance  Patient is able to walk up a flight of stairs or 1-2 blocks without chest pain or shortness of breath?  Yes    Anesthetic History  Personal History of Problems: No  Family History of Problems: No    Physical Examination    Airway    Mallampati: II    Dentition: intact    Mouth Opening:   >3 FB    Prominent Incisors: no    Thyromental Distance:   >3

## 2018-07-12 NOTE — BRIEF OP NOTE
Brief Postoperative Note    Latonya Shaw  YOB: 1975  [id number]    Pre-operative Diagnosis: Epigastric pain, nausea    Post-operative Diagnosis: Same    Procedure: EGD    Anesthesia: MAC    Surgeons/Assistants: Juan    Estimated Blood Loss: None    Complications: None    Specimens: Was Not Obtained    Findings: See dictated report    Electronically signed by Linda Buchanan MD on 7/12/2018 at 12:50 PM

## 2018-07-13 NOTE — PROCEDURES
[address] [facility]                                  PROCEDURE NOTE    PATIENT NAME: Lucious Gitelman                 :        1975  MED REC NO:   [id number]                          ROOM:  ACCOUNT NO:   [de-identified]                          ADMIT DATE: 2018  PROVIDER:     Karmen Duke MD    EGD    DATE OF PROCEDURE:  2018    REFERRING PROVIDER:  Kaylee Clifton. Yovana Mrecado MD    PATIENT HISTORY:  This is a 81-year-old female, outpatient. INSTRUMENT USED:  Olympus GIF-H180. MEDICATIONS OF PROCEDURE:  The patient was premedicated with Diprivan  intravenously as administered by the anesthesiology service. INDICATIONS:  The patient has presented with epigastric pain and nausea. Most of her pain and tenderness is likely neuromuscular. She has  hyperesthesia throughout the abdominal wall, particularly in the upper  abdomen. She also complains of paresthesias. This may be more related to  her fibromyalgia. However, she does have the nausea and does take naproxen  at high dose on a daily basis. PROCEDURE:  The endoscope was inserted into the esophagus without  difficulty. The esophageal mucosa was entirely normal, revealing no  evidence of inflammatory or metaplastic change. The Z-line was located at  40 cm. In the stomach, there were multiple linear antral erosions. These  would be typical changes associated with NSAID use. The duodenal bulb and  descending duodenum were normal.    IMPRESSION:  Erosive antral gastritis. PLAN:  The patient will be placed on a PPI such as omeprazole 40 mg daily. She will likely have to hold her naproxen for the time being. She is  already scheduled for a CT scan of the abdomen and pelvis. I think she  will improve somewhat symptomatically with acid suppression, but most of  her pain and tenderness appears to be neuromuscular.         Jose Martin Chávez,

## 2018-07-16 ENCOUNTER — OFFICE VISIT (OUTPATIENT)
Dept: RHEUMATOLOGY | Age: 43
End: 2018-07-16

## 2018-07-16 VITALS
DIASTOLIC BLOOD PRESSURE: 70 MMHG | WEIGHT: 180.6 LBS | BODY MASS INDEX: 28.35 KG/M2 | HEART RATE: 80 BPM | HEIGHT: 67 IN | SYSTOLIC BLOOD PRESSURE: 110 MMHG

## 2018-07-16 DIAGNOSIS — M79.7 FIBROMYALGIA: Primary | ICD-10-CM

## 2018-07-16 DIAGNOSIS — M25.50 POLYARTHRALGIA: ICD-10-CM

## 2018-07-16 PROCEDURE — G8427 DOCREV CUR MEDS BY ELIG CLIN: HCPCS | Performed by: INTERNAL MEDICINE

## 2018-07-16 PROCEDURE — 1036F TOBACCO NON-USER: CPT | Performed by: INTERNAL MEDICINE

## 2018-07-16 PROCEDURE — G8417 CALC BMI ABV UP PARAM F/U: HCPCS | Performed by: INTERNAL MEDICINE

## 2018-07-16 PROCEDURE — 99214 OFFICE O/P EST MOD 30 MIN: CPT | Performed by: INTERNAL MEDICINE

## 2018-07-16 RX ORDER — DULOXETIN HYDROCHLORIDE 30 MG/1
CAPSULE, DELAYED RELEASE ORAL
Qty: 30 CAPSULE | Refills: 0 | Status: SHIPPED | OUTPATIENT
Start: 2018-07-16 | End: 2018-08-15 | Stop reason: SDUPTHER

## 2018-07-16 RX ORDER — DULOXETIN HYDROCHLORIDE 60 MG/1
CAPSULE, DELAYED RELEASE ORAL
Qty: 30 CAPSULE | Refills: 5 | Status: SHIPPED | OUTPATIENT
Start: 2018-07-16 | End: 2019-04-05 | Stop reason: SDUPTHER

## 2018-07-16 NOTE — PROGRESS NOTES
tinnitus, loss of hearing, vertigo, or recurrent ear infections.  - No history of nasal/oral ulcers. - No history of sicca symptoms. Cardiovascular: No history of pericarditis, or murmur or palpitations  Respiratory: No cough or history of interstitial lung disease. No history of pleurisy. No history of tuberculosis or atypical infections. Gastrointestinal: No history of heart burn, dysphagia or esophageal dysmotility. No change in bowel habits or any inflammatory bowel disease. Genitourinary: No history renal disease, miscarriages. Hematologic/Lymphatic: No abnormal bruising or bleeding, blood clots or swollen lymph nodes. Neurological: No history seizure or focal weakness. No history of neuropathies, paresthesias or hyperesthesias, facial droop, diplopia  Psychiatric: No history of bipolar disease  Endocrine: Denies any thyroid / parathyroid disease and osteoporosis  Allergic/Immunologic: No nasal congestion or hives. I have reviewed patients Past medical History, Social History and Family History as mentioned in her chart and this remains unchanged from previous. Past Medical History:   Diagnosis Date    Anxiety     Chest wall pain     Constipation     Costochondritis     Depression     Fibroid, uterine     Fibromyalgia     Pleurisy     [facility]     Past Surgical History:   Procedure Laterality Date     SECTION      MOUTH SURGERY N/A 2016    wisdom teeth    MYOMECTOMY       Social History     Social History    Marital status:      Spouse name: N/A    Number of children: N/A    Years of education: N/A     Occupational History    Not on file.      Social History Main Topics    Smoking status: Former Smoker     Packs/day: 0.25     Years: 15.00     Quit date: 2012    Smokeless tobacco: Never Used    Alcohol use Yes      Comment: social    Drug use: No    Sexual activity: Yes     Partners: Male     Other Topics Concern    Not on no mass, no rebound, no guarding   :  No costovertebral angle tenderness   Integument:  Well hydrated, no rash or telangiectasias  Lymphatic:  No lymphadenopathy noted   Neurologic:   Alert & oriented x 3, CN 2-12 normal, no focal deficits noted. Sensations Intact. Muscle strength 5/5 proximally and distally in upper and lower extremities. Psychiatric:  Speech and behavior appropriate           LABS AND IMAGING  Outside data reviewed and in HPI    Lab Results   Component Value Date    WBC 6.2 06/28/2018    RBC 3.77 06/28/2018    HGB 12.1 06/28/2018    HCT 35.4 06/28/2018     06/28/2018    MCV 93.8 06/28/2018    MCH 32.0 06/28/2018    MCHC 34.1 06/28/2018    RDW 13.7 06/28/2018    SEGSPCT 31.0 01/03/2013    LYMPHOPCT 37.0 06/28/2018    MONOPCT 7.6 06/28/2018    EOSPCT 3.1 11/20/2010    BASOPCT 0.8 06/28/2018    MONOSABS 0.5 06/28/2018    LYMPHSABS 2.3 06/28/2018    EOSABS 0.4 06/28/2018    BASOSABS 0.1 06/28/2018    DIFFTYPE Manual 01/03/2013       Chemistry        Component Value Date/Time     06/28/2018 2145    K 3.8 06/28/2018 2145     06/28/2018 2145    CO2 25 06/28/2018 2145    BUN 15 06/28/2018 2145    CREATININE 0.8 06/28/2018 2145        Component Value Date/Time    CALCIUM 8.8 06/28/2018 2145    ALKPHOS 41 06/28/2018 2145    AST 15 06/28/2018 2145    ALT 11 06/28/2018 2145    BILITOT 0.5 06/28/2018 2145          Lab Results   Component Value Date    SEDRATE 15 08/22/2017     Lab Results   Component Value Date    CRP 2.1 08/22/2017     Lab Results   Component Value Date    ANNEMARIE Negative 05/19/2015     Lab Results   Component Value Date    RF <10.0 08/22/2017    CCPABIGG 6 08/22/2017     Lab Results   Component Value Date    ANNEMARIE Negative 05/19/2015    ANAINT see below 05/19/2015       ASSESSMENT AND PLAN      Assessment/Plan:      ASSESSMENT:    1. Fibromyalgia    2. Polyarthralgia        PLAN:   1.  Fibromyalgia  Continues to be symptomatic  -Continue Lyrica 150 mg twice a DAY   -I will start Cymbalta 30 mg for 1 month and then increase to 60 mg if tolerated-side effects of Cymbalta were discussed with the patient including worsening depression, abnormal dreams, dry mouth, dizziness, weight fluctuations and were discussed with the patient  -Continue Robaxin  Strongly emphasized on low impact aerobic and stretching exercises including biking, swimming, walking, yoga or tiachi classes    Counseled on Sleep hygiene    Advised to drink 64 oz of water and limit caffeine intake to 8 oz/day   -Continue aquatic therapy  - DULoxetine (CYMBALTA) 30 MG extended release capsule; Take cymbalta 30 mg daily for 1 month and then increase to 60 mg daily  Dispense: 30 capsule; Refill: 0  - DULoxetine (CYMBALTA) 60 MG extended release capsule; Take cymbalta 30 mg daily for 1 month and then increase to 60 mg daily  Dispense: 30 capsule; Refill: 5    2. Polyarthralgia  most likely secondary to fibromyalgia/chronic pain. Blood work negative for rheumatoid arthritis. Time spent with the patient 25 minutes and half of the time spent with counseling/coordination     The patient indicates understanding of these issues and agrees with the plan. Return in about 6 months (around 1/16/2019). The risks and benefits of my recommendations, as well as other treatment options, benefits and side effects were discussed with the patient. All questions were answered. ######################################################################    I thank you for giving me the opportunity to participate in Claudean Eaton care. If you have any questions or concerns please feel free to contact me. I look forward to following  [place] along with you. Electronically signed by: Marianne Smith MD, 7/16/2018 3:02 PM    Documentation was done using voice recognition dragon software. Every effort was made to ensure accuracy; however, inadvertent unintentional computerized transcription errors may be present.

## 2018-07-17 ENCOUNTER — HOSPITAL ENCOUNTER (OUTPATIENT)
Dept: CT IMAGING | Age: 43
Discharge: HOME | End: 2018-07-17
Attending: SURGERY | Admitting: SURGERY

## 2018-07-17 ENCOUNTER — HOSPITAL ENCOUNTER (OUTPATIENT)
Dept: PHYSICAL THERAPY | Age: 43
Discharge: HOME | End: 2018-07-18
Admitting: INTERNAL MEDICINE

## 2018-07-17 DIAGNOSIS — R10.13 EPIGASTRIC PAIN: ICD-10-CM

## 2018-07-17 DIAGNOSIS — R10.13 EPIGASTRIC ABDOMINAL PAIN: ICD-10-CM

## 2018-07-19 ENCOUNTER — HOSPITAL ENCOUNTER (OUTPATIENT)
Dept: PHYSICAL THERAPY | Age: 43
Discharge: HOME | End: 2018-07-20
Admitting: INTERNAL MEDICINE

## 2018-07-19 NOTE — FLOWSHEET NOTE
Plan of care initiated [] Hold pending MD visit [] Discharge    Plan for Next Session:  Increase seated ex x 30 , box step as tolerated.      Electronically signed by: Angelica Rojas, PTA  [id number]

## 2018-07-23 ENCOUNTER — TELEPHONE (OUTPATIENT)
Dept: SURGERY | Age: 43
End: 2018-07-23

## 2018-07-23 ENCOUNTER — PAT TELEPHONE (OUTPATIENT)
Dept: PREADMISSION TESTING | Age: 43
End: 2018-07-23

## 2018-07-23 VITALS — HEIGHT: 67 IN | BODY MASS INDEX: 28.25 KG/M2 | WEIGHT: 180 LBS

## 2018-07-23 RX ORDER — PREGABALIN 150 MG/1
150 CAPSULE ORAL 2 TIMES DAILY
COMMUNITY
End: 2018-07-24 | Stop reason: SDUPTHER

## 2018-07-23 NOTE — TELEPHONE ENCOUNTER
Spoke with patient regarding surgery Open Epigastric Hernia Repair, Possible Mesh; Excision of Lipomas of Left Chest Wall, Right Lower Back, Bilateral Thighs and Bilateral Upper Extremities scheduled on 07- with Dr. Francesca Borrego. Patient is asked to arrive by 10:30 AM @ [facility].  NPO after midnight. You will need someone to drive you home following this procedure, and to stay with you for the remainder of the day, due to the anesthesia. Please bring a Photo ID & Insurance card with you, and check-in at the Surgery Desk down the right-hand hallway on the first floor. Surgery is scheduled to start at approx. 12:00 PM and should take approx. 2 1/2 hours. Afterwards, you will be moved to the Recovery Unit until you are discharged. A few days prior to surgery, you should receive a call from the hospital P.A. T. Dept. They will go over all your medications and health history, prior to surgery. You may also receive a call from the hospital Pre-Registration Dept. They will go over your insurance information. Patient expressed a verbal understanding of these instructions and had no further questions at this time. Call ended.

## 2018-07-23 NOTE — PRE-PROCEDURE INSTRUCTIONS
[address] [facility] time_1030___________        Surgery time_1200___________    Take the following medications with a sip of water:  Lyrica, Cymbalta    Do not eat or drink anything after 12:00 midnight prior to your surgery. This includes water chewing gum, mints and ice chips. You may brush your teeth and gargle the morning of your surgery, but do not swallow the water     Please see your family doctor/pediatrician for a history and physical and/or concerning medications. Bring any test results/reports from your physicians office. If you are under the care of a heart doctor or specialist doctor, please be aware that you may be asked to them for clearance    You may be asked to stop blood thinners such as Coumadin, Plavix, Fragmin, Lovenox, etc., or any anti-inflammatories such as:  Aspirin, Ibuprofen, Advil, Naproxen prior to your surgery. We also ask that you stop any OTC medications such as fish oil, vitamin E, glucosamine, garlic, Multivitamins, COQ 10, etc.    We ask that you do not smoke 24 hours prior to surgery  We ask that you do not  drink any alcoholic beverages 24 hours prior to surgery     You must make arrangements for a responsible adult to take you home after your surgery. For your safety you will not be allowed to leave alone or drive yourself home. Your surgery will be cancelled if you do not have a ride home. Also for your safety, it is strongly suggested that someone stay with you the first 24 hours after your surgery. A parent or legal guardian must accompany a child scheduled for surgery and plan to stay at the hospital until the child is discharged. Please do not bring other children with you. For your comfort, please wear simple loose fitting clothing to the hospital.  Please do not bring valuables.     Do not wear any make-up or nail polish on your fingers or toes      For your safety, please do not wear any jewelry or body piercing's on the day of surgery. All jewelry must be removed. If you have dentures, they will be removed before going to operating room. For your convenience, we will provide you with a container. If you wear contact lenses or glasses, they will be removed, please bring a case for them. If you have a living will and a durable power of  for healthcare, please bring in a copy. As part of our patient safety program to minimize surgical site infections, we ask you to do the following:    · Please notify your surgeon if you develop any illness between         now and the  day of your surgery. · This includes a cough, cold, fever, sore throat, nausea,         or vomiting, and diarrhea, etc.  ·  Please notify your surgeon if you experience dizziness, shortness         of breath or blurred vision between now and the time of your surgery. Do not shave your operative site 96 hours prior to surgery. For face and neck surgery, men may use an electric razor 48 hours   prior to surgery. You may shower the night before surgery or the morning of   your surgery with an antibacterial soap. You will need to bring a photo ID and insurance card    [facility] has an onsite pharmacy, would you like to utilize our pharmacy     If you will be staying overnight and use a C-pap machine, please bring   your C-pap to hospital     Our goal is to provide you with excellent care, therefore, visitors will be limited to two(2) in the room at a time so that we may focus on providing this care for you. Please contact pre-admission testing if you have any further questions. [facility] phone number:  [phone] [facility] fax number:  [phone]  Please note these are generalized instructions for all surgical cases, you may be provided with more specific instructions according to your surgery.

## 2018-07-24 ENCOUNTER — HOSPITAL ENCOUNTER (OUTPATIENT)
Dept: SURGERY | Age: 43
Discharge: HOME | End: 2018-07-24
Attending: SURGERY | Admitting: SURGERY

## 2018-07-24 ENCOUNTER — TELEPHONE (OUTPATIENT)
Dept: INTERNAL MEDICINE CLINIC | Age: 43
End: 2018-07-24

## 2018-07-24 VITALS
WEIGHT: 175.38 LBS | HEIGHT: 67 IN | RESPIRATION RATE: 16 BRPM | DIASTOLIC BLOOD PRESSURE: 83 MMHG | SYSTOLIC BLOOD PRESSURE: 124 MMHG | HEART RATE: 74 BPM | BODY MASS INDEX: 27.53 KG/M2 | TEMPERATURE: 97.2 F | OXYGEN SATURATION: 99 %

## 2018-07-24 DIAGNOSIS — D17.24 LIPOMA OF LEFT THIGH: ICD-10-CM

## 2018-07-24 DIAGNOSIS — D17.20 LIPOMA OF UPPER ARM: ICD-10-CM

## 2018-07-24 DIAGNOSIS — M79.7 FIBROMYALGIA: Primary | ICD-10-CM

## 2018-07-24 DIAGNOSIS — D17.1 LIPOMA OF CHEST WALL: ICD-10-CM

## 2018-07-24 DIAGNOSIS — K43.6 INCARCERATED EPIGASTRIC HERNIA: Primary | ICD-10-CM

## 2018-07-24 DIAGNOSIS — D17.1 LIPOMA OF LOWER BACK: ICD-10-CM

## 2018-07-24 DIAGNOSIS — D17.23 LIPOMA OF RIGHT THIGH: ICD-10-CM

## 2018-07-24 LAB — PREGNANCY, URINE: NEGATIVE

## 2018-07-24 PROCEDURE — 24075 EXC ARM/ELBOW LES SC < 3 CM: CPT | Performed by: SURGERY

## 2018-07-24 PROCEDURE — 27337 EXC THIGH/KNEE LES SC 3 CM/>: CPT | Performed by: SURGERY

## 2018-07-24 PROCEDURE — 21931 EXC BACK LES SC 3 CM/>: CPT | Performed by: SURGERY

## 2018-07-24 PROCEDURE — 22902 EXC ABD LES SC < 3 CM: CPT | Performed by: SURGERY

## 2018-07-24 PROCEDURE — 49572 PR REPAIR EPIGASTRIC HERNIA,STRANG: CPT | Performed by: SURGERY

## 2018-07-24 PROCEDURE — 27327 EXC THIGH/KNEE LES SC < 3 CM: CPT | Performed by: SURGERY

## 2018-07-24 RX ORDER — SODIUM CHLORIDE 0.9 % (FLUSH) 0.9 %
10 SYRINGE (ML) INJECTION PRN
Status: DISCONTINUED | OUTPATIENT
Start: 2018-07-24 | End: 2018-07-25 | Stop reason: HOSPADM

## 2018-07-24 RX ORDER — SODIUM CHLORIDE 0.9 % (FLUSH) 0.9 %
10 SYRINGE (ML) INJECTION EVERY 12 HOURS SCHEDULED
Status: DISCONTINUED | OUTPATIENT
Start: 2018-07-24 | End: 2018-07-25 | Stop reason: HOSPADM

## 2018-07-24 RX ORDER — OXYCODONE HYDROCHLORIDE AND ACETAMINOPHEN 5; 325 MG/1; MG/1
1-2 TABLET ORAL EVERY 6 HOURS PRN
Qty: 28 TABLET | Refills: 0 | Status: SHIPPED | OUTPATIENT
Start: 2018-07-24 | End: 2018-07-31

## 2018-07-24 RX ORDER — PROMETHAZINE HYDROCHLORIDE 25 MG/ML
6.25 INJECTION, SOLUTION INTRAMUSCULAR; INTRAVENOUS
Status: ACTIVE | OUTPATIENT
Start: 2018-07-24 | End: 2018-07-24

## 2018-07-24 RX ORDER — PREGABALIN 150 MG/1
150 CAPSULE ORAL 2 TIMES DAILY
Qty: 60 CAPSULE | Refills: 2 | Status: SHIPPED | OUTPATIENT
Start: 2018-07-24 | End: 2018-11-27 | Stop reason: SDUPTHER

## 2018-07-24 RX ORDER — FENTANYL CITRATE 50 UG/ML
50 INJECTION, SOLUTION INTRAMUSCULAR; INTRAVENOUS EVERY 5 MIN PRN
Status: DISCONTINUED | OUTPATIENT
Start: 2018-07-24 | End: 2018-07-25 | Stop reason: HOSPADM

## 2018-07-24 RX ORDER — OMEPRAZOLE 20 MG/1
40 CAPSULE, DELAYED RELEASE ORAL DAILY
COMMUNITY

## 2018-07-24 RX ORDER — OXYCODONE HYDROCHLORIDE AND ACETAMINOPHEN 5; 325 MG/1; MG/1
2 TABLET ORAL
Status: COMPLETED | OUTPATIENT
Start: 2018-07-24 | End: 2018-07-24

## 2018-07-24 RX ORDER — CLINDAMYCIN PHOSPHATE 900 MG/50ML
900 INJECTION INTRAVENOUS ONCE
Status: COMPLETED | OUTPATIENT
Start: 2018-07-24 | End: 2018-07-24

## 2018-07-24 RX ORDER — ONDANSETRON 2 MG/ML
4 INJECTION INTRAMUSCULAR; INTRAVENOUS
Status: ACTIVE | OUTPATIENT
Start: 2018-07-24 | End: 2018-07-24

## 2018-07-24 RX ORDER — SODIUM CHLORIDE 9 MG/ML
INJECTION, SOLUTION INTRAVENOUS CONTINUOUS
Status: DISCONTINUED | OUTPATIENT
Start: 2018-07-24 | End: 2018-07-25 | Stop reason: HOSPADM

## 2018-07-24 RX ORDER — FENTANYL CITRATE 50 UG/ML
25 INJECTION, SOLUTION INTRAMUSCULAR; INTRAVENOUS EVERY 5 MIN PRN
Status: DISCONTINUED | OUTPATIENT
Start: 2018-07-24 | End: 2018-07-25 | Stop reason: HOSPADM

## 2018-07-24 RX ADMIN — OXYCODONE HYDROCHLORIDE AND ACETAMINOPHEN 2 TABLET: 5; 325 TABLET ORAL at 16:06

## 2018-07-24 RX ADMIN — FENTANYL CITRATE 50 MCG: 50 INJECTION, SOLUTION INTRAMUSCULAR; INTRAVENOUS at 15:06

## 2018-07-24 RX ADMIN — FENTANYL CITRATE 25 MCG: 50 INJECTION, SOLUTION INTRAMUSCULAR; INTRAVENOUS at 15:17

## 2018-07-24 RX ADMIN — CLINDAMYCIN PHOSPHATE 900 MG: 900 INJECTION INTRAVENOUS at 12:38

## 2018-07-24 ASSESSMENT — PAIN DESCRIPTION - LOCATION
LOCATION: ABDOMEN
LOCATION: ABDOMEN

## 2018-07-24 ASSESSMENT — PAIN SCALES - GENERAL
PAINLEVEL_OUTOF10: 8
PAINLEVEL_OUTOF10: 8
PAINLEVEL_OUTOF10: 6
PAINLEVEL_OUTOF10: 8

## 2018-07-24 ASSESSMENT — PAIN DESCRIPTION - PAIN TYPE
TYPE: SURGICAL PAIN
TYPE: SURGICAL PAIN

## 2018-07-24 ASSESSMENT — PAIN - FUNCTIONAL ASSESSMENT: PAIN_FUNCTIONAL_ASSESSMENT: 0-10

## 2018-07-24 ASSESSMENT — PAIN DESCRIPTION - DESCRIPTORS
DESCRIPTORS: ACHING

## 2018-07-24 ASSESSMENT — PAIN DESCRIPTION - ORIENTATION
ORIENTATION: MID
ORIENTATION: MID

## 2018-07-24 ASSESSMENT — PAIN DESCRIPTION - PROGRESSION
CLINICAL_PROGRESSION: NOT CHANGED
CLINICAL_PROGRESSION: GRADUALLY WORSENING

## 2018-07-24 ASSESSMENT — PAIN DESCRIPTION - FREQUENCY
FREQUENCY: CONTINUOUS
FREQUENCY: CONTINUOUS

## 2018-07-24 NOTE — ANESTHESIA POST-OP
[facility] Department of Anesthesiology  Post-Anesthesia Note       Name:  Sharda Gordillo                                  Age:  40 y.o. MRN:  [id number]     Last Vitals & Oxygen Saturation: /83   Pulse 74   Temp 97.2 °F (36.2 °C) (Temporal)   Resp 16   Ht 5' 6.5\" (1.689 m)   Wt 175 lb 6 oz (79.5 kg)   LMP 07/06/2018   SpO2 99%   BMI 27.88 kg/m²   Patient Vitals for the past 4 hrs:   BP Temp Temp src Pulse Resp SpO2   07/24/18 1649 124/83 - - 74 16 99 %   07/24/18 1535 (!) 141/94 97.2 °F (36.2 °C) Temporal 73 16 99 %   07/24/18 1525 (!) 140/71 - - - - -   07/24/18 1517 - 98.2 °F (36.8 °C) Temporal - - -   07/24/18 1513 - - - 74 13 100 %   07/24/18 1512 - - - 89 15 98 %   07/24/18 1511 - - - 82 16 98 %   07/24/18 1510 - - - 78 14 100 %   07/24/18 1509 - - - 80 12 100 %   07/24/18 1508 - - - 79 12 100 %   07/24/18 1502 - - - 81 10 100 %   07/24/18 1501 (!) 148/96 - - 83 10 100 %   07/24/18 1500 - - - 88 16 100 %   07/24/18 1459 - - - 92 15 100 %   07/24/18 1458 - - - 91 12 100 %   07/24/18 1457 - - - 94 19 100 %   07/24/18 1453 - - - 102 17 98 %   07/24/18 1452 - - - 93 16 99 %   07/24/18 1451 (!) 131/109 - - 102 13 100 %   07/24/18 1450 - - - 93 8 100 %   07/24/18 1449 - - - 93 16 100 %   07/24/18 1448 - - - 95 11 100 %   07/24/18 1447 - - - 95 13 100 %   07/24/18 1446 133/85 - - 94 12 99 %   07/24/18 1445 (!) 132/97 - - 96 12 99 %   07/24/18 1443 - 97.3 °F (36.3 °C) Temporal - - 100 %       Level of consciousness:  Awake, alert to baseline    Respiratory: Respirations easy, no distress. Stable. Cardiovascular: Hemodynamically stable. Hydration: Adequate. PONV: Adequately managed. Post-op pain: Adequately controlled. Post-op assessment: Tolerated anesthetic well without complication. Complications:  None.     Katty Mckeon MD  July 24, 2018   5:33 PM

## 2018-07-24 NOTE — PROGRESS NOTES
Report received from [facility]. Pt resting on stretcher.  present.

## 2018-07-24 NOTE — ANESTHESIA PRE-OP
[facility] Department of Anesthesiology  Pre-Anesthesia Evaluation/Consultation       Name:  Charmayne River  : 1975  Age:  40 y.o. MRN:  [id number]  Date: 2018           Procedure (Scheduled):  Open epigastric hernia repair  Surgeon:  Dr. Wendi Alicea     Patient Active Problem List   Diagnosis    Sickle cell trait ([facility]    Anemia    Chest wall pain    Anxiety    Reactive depression    Constipation    Fibroid uterus    Fibromyalgia     Past Medical History:   Diagnosis Date    Anxiety     Chest wall pain     Constipation     Costochondritis     Depression     Fibroid, uterine     Fibromyalgia     Pleurisy     Sickle cell trait ([facility] 2016     Past Surgical History:   Procedure Laterality Date     SECTION      MOUTH SURGERY N/A 2016    wisdom teeth    MYOMECTOMY       Social History   Substance Use Topics    Smoking status: Former Smoker     Packs/day: 0.25     Years: 15.00     Quit date: 2012    Smokeless tobacco: Never Used    Alcohol use Yes      Comment: social     Medications  Current Outpatient Prescriptions on File Prior to Encounter   Medication Sig Dispense Refill    pregabalin (LYRICA) 150 MG capsule Take 150 mg by mouth 2 times daily. Orval Opitz DULoxetine (CYMBALTA) 30 MG extended release capsule Take cymbalta 30 mg daily for 1 month and then increase to 60 mg daily 30 capsule 0    ondansetron (ZOFRAN) 4 MG tablet Take 4 mg by mouth every 8 hours as needed for Nausea or Vomiting      methocarbamol (ROBAXIN) 500 MG tablet TAKE ONE TABLET BY MOUTH THREE TIMES DAILY AS NEEDED FOR MUSCLE PAIN OR SPASMS 90 tablet 5    polyethylene glycol (GLYCOLAX) powder MIX 17 GRAMS (1 CAPFUL) WITH 8 OZ OF FAVORITE LIQUID AND TAKE  BY MOUTH DAILY 1 Bottle 3    DULoxetine (CYMBALTA) 60 MG extended release capsule Take cymbalta 30 mg daily for 1 month and then increase to 60 mg daily 30 capsule 5    vitamin E 400 UNIT capsule Take 400 Units by mouth daily      Cholecalciferol (VITAMIN D3) 2000 units CAPS Take by mouth      Lactobacillus (PROBIOTIC ACIDOPHILUS PO) Take 1 capsule by mouth daily       magnesium oxide (MAG-OX) 400 MG tablet Take 400 mg by mouth daily      ferrous sulfate 325 (65 Fe) MG tablet Take 325 mg by mouth daily (with breakfast)      Alpha-Lipoic Acid 200 MG CAPS Take by mouth      valACYclovir (VALTREX) 500 MG tablet Take 500 mg by mouth 3 times daily as needed       No current facility-administered medications on file prior to encounter. Current Outpatient Prescriptions   Medication Sig Dispense Refill    omeprazole (PRILOSEC) 20 MG delayed release capsule Take 40 mg by mouth daily      pregabalin (LYRICA) 150 MG capsule Take 150 mg by mouth 2 times daily. Bryan Acosta DULoxetine (CYMBALTA) 30 MG extended release capsule Take cymbalta 30 mg daily for 1 month and then increase to 60 mg daily 30 capsule 0    ondansetron (ZOFRAN) 4 MG tablet Take 4 mg by mouth every 8 hours as needed for Nausea or Vomiting      methocarbamol (ROBAXIN) 500 MG tablet TAKE ONE TABLET BY MOUTH THREE TIMES DAILY AS NEEDED FOR MUSCLE PAIN OR SPASMS 90 tablet 5    polyethylene glycol (GLYCOLAX) powder MIX 17 GRAMS (1 CAPFUL) WITH 8 OZ OF FAVORITE LIQUID AND TAKE  BY MOUTH DAILY 1 Bottle 3    DULoxetine (CYMBALTA) 60 MG extended release capsule Take cymbalta 30 mg daily for 1 month and then increase to 60 mg daily 30 capsule 5    vitamin E 400 UNIT capsule Take 400 Units by mouth daily      Cholecalciferol (VITAMIN D3) 2000 units CAPS Take by mouth      Lactobacillus (PROBIOTIC ACIDOPHILUS PO) Take 1 capsule by mouth daily       magnesium oxide (MAG-OX) 400 MG tablet Take 400 mg by mouth daily      ferrous sulfate 325 (65 Fe) MG tablet Take 325 mg by mouth daily (with breakfast)      Alpha-Lipoic Acid 200 MG CAPS Take by mouth      valACYclovir (VALTREX) 500 AST 15 06/28/2018    ALT 11 06/28/2018     BMP    Lab Results   Component Value Date     06/28/2018    K 3.8 06/28/2018     06/28/2018    CO2 25 06/28/2018    BUN 15 06/28/2018    CREATININE 0.8 06/28/2018    CALCIUM 8.8 06/28/2018    GFRAA >60 06/28/2018    GFRAA >60 01/03/2013    LABGLOM >60 06/28/2018    GLUCOSE 85 06/28/2018     POCGlucose  No results for input(s): GLUCOSE in the last 72 hours. Coags    Lab Results   Component Value Date    PROTIME 11.0 02/29/2016    INR 1.02 02/29/2016    APTT 29.4 85/04/1061     HCG (If Applicable)   Lab Results   Component Value Date    PREGTESTUR Negative 07/12/2018      ABGs No results found for: PHART, PO2ART, NMW5KCC, RWY0VBK, BEART, U6MGVMBS   Type & Screen (If Applicable)  No results found for: LABABO, LABRH                         BMI: Wt Readings from Last 3 Encounters:       NPO Status:   Date of last liquid consumption: 07/23/18   Time of last liquid consumption: 2300   Date of last solid food consumption: 07/23/18      Time of last solid consumption: 2300       Anesthesia Evaluation  Patient summary reviewed no history of anesthetic complications:   Airway: Mallampati: II  TM distance: >3 FB   Neck ROM: full  Mouth opening: > = 3 FB Dental:          Pulmonary:Negative Pulmonary ROS breath sounds clear to auscultation                             Cardiovascular:Negative CV ROS  Exercise tolerance: good (>4 METS),           Rhythm: regular  Rate: normal                    Neuro/Psych:   (+) psychiatric history: stable with treatmentdepression/anxiety             GI/Hepatic/Renal: Neg GI/Hepatic/Renal ROS            Endo/Other: Negative Endo/Other ROS                     ROS comment: Sickle cell trait Abdominal:         (-) obese     Vascular: negative vascular ROS. Anesthesia Plan      general     ASA 2       Induction: intravenous.     MIPS: Postoperative opioids intended and Prophylactic antiemetics administered. Anesthetic plan and risks discussed with patient. Plan discussed with CRNA. This pre-anesthesia assessment may be used as a history and physical.    DOS STAFF ADDENDUM:    Pt seen and examined, chart reviewed (including anesthesia, drug and allergy history). No interval changes to history and physical examination. Anesthetic plan, risks, benefits, alternatives, and personnel involved discussed with patient. Patient verbalized an understanding and agrees to proceed.       Fredrick Bragg MD  July 24, 2018  10:57 AM

## 2018-07-24 NOTE — PROGRESS NOTES
Pt arrived to pacu from or awake but sleepy. VSS on monitor. x8 surgical incisions cdi with surgical glue. Ice applied to ab incision. Ab soft. Pt denies pain. Pt states need to void. Pt given bed pan. Pt in bed on bed pan.

## 2018-08-01 ENCOUNTER — HOSPITAL ENCOUNTER (OUTPATIENT)
Dept: OTHER | Age: 43
Discharge: HOME | End: 2018-08-31
Attending: INTERNAL MEDICINE | Admitting: INTERNAL MEDICINE

## 2018-08-02 DIAGNOSIS — Z09 S/P EPIGASTRIC HERNIA REPAIR, FOLLOW-UP EXAM: Primary | ICD-10-CM

## 2018-08-02 RX ORDER — HYDROCODONE BITARTRATE AND ACETAMINOPHEN 5; 325 MG/1; MG/1
1 TABLET ORAL EVERY 6 HOURS PRN
Qty: 20 TABLET | Refills: 0 | Status: SHIPPED | OUTPATIENT
Start: 2018-08-02 | End: 2018-08-09

## 2018-08-03 ENCOUNTER — TELEPHONE (OUTPATIENT)
Dept: SURGERY | Age: 43
End: 2018-08-03

## 2018-08-03 NOTE — TELEPHONE ENCOUNTER
Spoke with Katia Meadows from pharmacy. He reports when the pharmacy gave the patient the RX on 7-24, they only gave her 20 and not 28. Just FYI call. Also, Norco needs pre-auth. Katia Meadows said he will call insurance and get an override so the patient can get it filled. Patient aware. Patient wants to know about creams and lotions for scars. Advised her not to do anything just yet, wait for her visit with Dr. Radha Edmond to discuss in further detail.

## 2018-08-03 NOTE — TELEPHONE ENCOUNTER
[address] calling to make you aware that when they filled the patient's RX they only gave her 20 Percocet and NOT 28 as ordered.

## 2018-08-06 ENCOUNTER — TELEPHONE (OUTPATIENT)
Dept: SURGERY | Age: 43
End: 2018-08-06

## 2018-08-06 ENCOUNTER — OFFICE VISIT (OUTPATIENT)
Dept: SURGERY | Age: 43
End: 2018-08-06

## 2018-08-06 VITALS
SYSTOLIC BLOOD PRESSURE: 136 MMHG | BODY MASS INDEX: 28.62 KG/M2 | WEIGHT: 180 LBS | HEART RATE: 90 BPM | DIASTOLIC BLOOD PRESSURE: 76 MMHG

## 2018-08-06 DIAGNOSIS — Z09 S/P EPIGASTRIC HERNIA REPAIR, FOLLOW-UP EXAM: Primary | ICD-10-CM

## 2018-08-06 DIAGNOSIS — D17.1 LIPOMA OF CHEST WALL: ICD-10-CM

## 2018-08-06 PROCEDURE — 21552 EXC NECK LES SC 3 CM/>: CPT | Performed by: SURGERY

## 2018-08-06 PROCEDURE — 99999 PR OFFICE/OUTPT VISIT,PROCEDURE ONLY: CPT | Performed by: SURGERY

## 2018-08-06 PROCEDURE — 21930 EXC BACK LES SC < 3 CM: CPT | Performed by: SURGERY

## 2018-08-06 NOTE — PATIENT INSTRUCTIONS
OK to shower overtop of glue. Sutures are dissolvable. These will absorb over time. OK to take tylenol or ibuprofen for pain control. Call for any worsening redness, pain, or fevers greater than 101.5. No restrictions, activities as tolerated. Follow up as needed.   If questions or concerns, please call Martine @ [phone]

## 2018-08-06 NOTE — TELEPHONE ENCOUNTER
Spoke with patient. Advised her I spoke with Pop Acosta last week and he reports he was going to get it over-rided. Patient states it cant get over-rided now it needs a PA. PA submitted. Patient aware.

## 2018-08-09 NOTE — PROGRESS NOTES
Completed on 7-5-18
kg/m². Wt Readings from Last 3 Encounters:   07/02/18 175 lb (79.4 kg)   06/28/18 185 lb 6.5 oz (84.1 kg)   06/18/18 175 lb (79.4 kg)     BP Readings from Last 3 Encounters:   07/02/18 117/77   06/28/18 121/71   06/18/18 107/72          Objective:    CONSTITUTIONAL:  awake, alert, no apparent distress    LUNGS:  Resp easy and unlabored  ABDOMEN:  soft, non-distended, epigastric tenderness present with nodularity, possible incarcerated epigastric hernia, voluntary guarding absent  MUSCULOSKELETAL: No edema; right posterior knee with fullness without discrete mass, no overlying skin changes  NEUROLOGIC:  Mental Status Exam:  Level of Alertness:   awake  Orientation:   person, place, time  SKIN: no erythema or induration        ASSESSMENT:     Diagnosis Orders   1. Epigastric abdominal pain  CT ABDOMEN PELVIS W IV CONTRAST Additional Contrast? None   2. Knee mass, right  US DUP LOWER EXTREMITY RIGHT ML   3. Lipoma of lower back     4. Lipoma of left upper extremity     5. Lipoma of right upper extremity     6. Lipoma of torso     7. Lipoma of left thigh     8. Lipoma of right thigh           PLAN:    Kathy Restrepo has multiple lipomas along with epigastric pain and right posterior knee lesion. She states that she wants all of her lipomas to be removed at the same time to be able to start aqua therapy. Will obtain a CT abd/pelvis to evaluate for an epigastric hernia. If present, will perform excision of her lipomas under general anesthesia at the same time of epigastric hernia repair. I reiterated that even with excision of her lipomas, her pain may not resolve. Will obtain a RLE venous duplex to evaluate for posterior knee varicosity. I do not appreciate a discrete cyst.  If venous disease present with refer to vascular surgery. Patient was seen with total face to face time of 25 minutes.  More than 50%   of this visit was spent evaluating her multiple problems and establishing and assessment and plan

## 2018-08-13 NOTE — PROGRESS NOTES
Post Operative Visit    [facility]  [place] [facility]   Gege Newton MD    [address], [address]  [place]  Phone: [phone]  Fax: [phone]    Lindsay Lopez   YOB: 1975    Date of Visit:  2018    No ref. provider found  Yovany Guerrero MD    Subjective:     Lindsay Lopez presents for a two week follow-up s/p epigastric hernia repair and lipoma removal x 7. Overall she has been doing well since her operation. There has been almost complete resolution of her pain. It is  controlled with her current pain regimen. She denies any fevers or chills. She did notice that she has three more lipomas, two on her right chest wall and one on the left chest wall that she would like to have removed. The are slowly enlarging over time and more painful. Pain Score:   0 - No pain    Allergies   Allergen Reactions    Penicillins Hives     Outpatient Prescriptions Marked as Taking for the 18 encounter (Office Visit) with Leroy Dobson MD   Medication Sig Dispense Refill    [] HYDROcodone-acetaminophen (NORCO) 5-325 MG per tablet Take 1 tablet by mouth every 6 hours as needed for Pain for up to 7 days. Intended supply: 3 days. Take lowest dose possible to manage pain. 20 tablet 0    omeprazole (PRILOSEC) 20 MG delayed release capsule Take 40 mg by mouth daily      pregabalin (LYRICA) 150 MG capsule Take 1 capsule by mouth 2 times daily for 30 days. . 60 capsule 2    DULoxetine (CYMBALTA) 30 MG extended release capsule Take cymbalta 30 mg daily for 1 month and then increase to 60 mg daily 30 capsule 0    DULoxetine (CYMBALTA) 60 MG extended release capsule Take cymbalta 30 mg daily for 1 month and then increase to 60 mg daily 30 capsule 5    vitamin E 400 UNIT capsule Take 400 Units by mouth daily      Cholecalciferol (VITAMIN D3) 2000 units CAPS Take by mouth      Lactobacillus (PROBIOTIC ACIDOPHILUS PO) Take 1 capsule by mouth daily       magnesium oxide (MAG-OX) 400 MG tablet Take 400 mg by mouth daily      ferrous sulfate 325 (65 Fe) MG tablet Take 325 mg by mouth daily (with breakfast)      Alpha-Lipoic Acid 200 MG CAPS Take by mouth      ondansetron (ZOFRAN) 4 MG tablet Take 4 mg by mouth every 8 hours as needed for Nausea or Vomiting      methocarbamol (ROBAXIN) 500 MG tablet TAKE ONE TABLET BY MOUTH THREE TIMES DAILY AS NEEDED FOR MUSCLE PAIN OR SPASMS 90 tablet 5    polyethylene glycol (GLYCOLAX) powder MIX 17 GRAMS (1 CAPFUL) WITH 8 OZ OF FAVORITE LIQUID AND TAKE  BY MOUTH DAILY 1 Bottle 3    valACYclovir (VALTREX) 500 MG tablet Take 500 mg by mouth 3 times daily as needed         Vitals:    08/06/18 0950   BP: 136/76   Pulse: 90   Weight: 180 lb (81.6 kg)     Body mass index is 28.62 kg/m². Wt Readings from Last 3 Encounters:   08/06/18 180 lb (81.6 kg)   07/24/18 175 lb 6 oz (79.5 kg)   07/23/18 180 lb (81.6 kg)     BP Readings from Last 3 Encounters:   08/06/18 136/76   07/24/18 124/83   07/16/18 110/70          Objective:    CONSTITUTIONAL:  awake, alert, no apparent distress    LUNGS:  Resp easy and unlabored  ABDOMEN:  Incision c/d/i, no erythema or induration, soft, non-distended, non-tender, voluntary guarding absent,  and hernia absent  MUSCULOSKELETAL: No edema  NEUROLOGIC:  Mental Status Exam:  Level of Alertness:   awake  Orientation:   person, place, time  SKIN: incisions healing well; right back/chest wall with 2 x 3.5 cm subcutaneous mass, right back 1.5 x 1 cm subcutaneous mass, left chest 1x1.5 cm subcutaneous mass, no overlying skin changes      Pathology:  FINAL DIAGNOSIS:       A. Epigastric hernia contents, excision:     - Fibromembranous tissue with mesothelial lining consistent with       hernia sac.     B. Lipoma, right thigh, excision:     - Mature adipose tissue, compatible with lipoma.     C.  Lipoma, left thigh, excision:     - Mature adipose tissue, compatible with

## 2018-08-14 ENCOUNTER — TELEPHONE (OUTPATIENT)
Dept: INTERNAL MEDICINE CLINIC | Age: 43
End: 2018-08-14

## 2018-08-14 NOTE — TELEPHONE ENCOUNTER
Pt calling for refill on cymbalta 30,g to increase to 60mg in Aug   Pt states the med needs a PA for refill. Pt also needs a refill on Vit D3    [facility] on file.

## 2018-08-15 DIAGNOSIS — M79.7 FIBROMYALGIA: ICD-10-CM

## 2018-08-15 RX ORDER — DULOXETIN HYDROCHLORIDE 60 MG/1
CAPSULE, DELAYED RELEASE ORAL
Qty: 30 CAPSULE | Refills: 0 | Status: SHIPPED | OUTPATIENT
Start: 2018-08-15 | End: 2018-11-12

## 2018-08-17 ENCOUNTER — PROCEDURE VISIT (OUTPATIENT)
Dept: SURGERY | Age: 43
End: 2018-08-17

## 2018-08-17 VITALS
BODY MASS INDEX: 28.62 KG/M2 | SYSTOLIC BLOOD PRESSURE: 131 MMHG | HEART RATE: 91 BPM | DIASTOLIC BLOOD PRESSURE: 83 MMHG | WEIGHT: 180 LBS

## 2018-08-17 DIAGNOSIS — D17.21 LIPOMA OF RIGHT FOREARM: ICD-10-CM

## 2018-08-17 DIAGNOSIS — D17.20 LIPOMA OF UPPER ARM: Primary | ICD-10-CM

## 2018-08-17 PROCEDURE — 25075 EXC FOREARM LES SC < 3 CM: CPT | Performed by: SURGERY

## 2018-08-17 PROCEDURE — 24075 EXC ARM/ELBOW LES SC < 3 CM: CPT | Performed by: SURGERY

## 2018-08-17 PROCEDURE — 99024 POSTOP FOLLOW-UP VISIT: CPT | Performed by: SURGERY

## 2018-08-17 RX ORDER — ACETAMINOPHEN 160 MG
1 TABLET,DISINTEGRATING ORAL DAILY
Qty: 30 CAPSULE | Refills: 0 | OUTPATIENT
Start: 2018-08-17 | End: 2018-09-16

## 2018-08-17 NOTE — TELEPHONE ENCOUNTER
Patient requested yesterday as well. Per your telephone encounter, patient was to discuss with Dr. Darrick Bueno as she has never prescribed before. I did not see this message before I pended new RX. Sorry.

## 2018-08-17 NOTE — PROGRESS NOTES
Post Operative Visit    [address]  [facility]   Ilda German MD    [address], [address] [facility], [facility]  Phone: [phone]  Fax: [phone]    Dominick Cruz   YOB: 1975    Date of Visit:  8/17/2018    No ref. provider found  Jamila Blanton MD    Subjective:     Dominick Cruz presents for a two week follow-up s/p excision of her two right chest wall/back and left back lipomas. Overall she has been doing well since her procedure. She is sore but pain is improving. No issues with her incisions. She denies any fevers or chills. Allergies   Allergen Reactions    Penicillins Hives     Outpatient Prescriptions Marked as Taking for the 8/17/18 encounter (Procedure visit) with Gavi Thurston MD   Medication Sig Dispense Refill    DULoxetine (CYMBALTA) 60 MG extended release capsule Take 1 tab po daily. 30 capsule 0    omeprazole (PRILOSEC) 20 MG delayed release capsule Take 40 mg by mouth daily      pregabalin (LYRICA) 150 MG capsule Take 1 capsule by mouth 2 times daily for 30 days. . 60 capsule 2    DULoxetine (CYMBALTA) 60 MG extended release capsule Take cymbalta 30 mg daily for 1 month and then increase to 60 mg daily 30 capsule 5    vitamin E 400 UNIT capsule Take 400 Units by mouth daily      Cholecalciferol (VITAMIN D3) 2000 units CAPS Take by mouth      Lactobacillus (PROBIOTIC ACIDOPHILUS PO) Take 1 capsule by mouth daily       magnesium oxide (MAG-OX) 400 MG tablet Take 400 mg by mouth daily      ferrous sulfate 325 (65 Fe) MG tablet Take 325 mg by mouth daily (with breakfast)      Alpha-Lipoic Acid 200 MG CAPS Take by mouth      ondansetron (ZOFRAN) 4 MG tablet Take 4 mg by mouth every 8 hours as needed for Nausea or Vomiting      methocarbamol (ROBAXIN) 500 MG tablet TAKE ONE TABLET BY MOUTH THREE TIMES DAILY AS NEEDED FOR MUSCLE PAIN OR SPASMS 90 tablet 5    polyethylene glycol (GLYCOLAX) powder MIX 17 GRAMS (1 CAPFUL) WITH 8 OZ OF FAVORITE LIQUID AND TAKE  BY MOUTH DAILY 1 Bottle 3    valACYclovir (VALTREX) 500 MG tablet Take 500 mg by mouth 3 times daily as needed         Vitals:    08/17/18 0919   BP: 131/83   Pulse: 91   Weight: 180 lb (81.6 kg)     Body mass index is 28.62 kg/m². Wt Readings from Last 3 Encounters:   08/17/18 180 lb (81.6 kg)   08/06/18 180 lb (81.6 kg)   07/24/18 175 lb 6 oz (79.5 kg)     BP Readings from Last 3 Encounters:   08/17/18 131/83   08/06/18 136/76   07/24/18 124/83          Objective:    CONSTITUTIONAL:  awake, alert, no apparent distress    LUNGS:  Resp easy and unlabored  ABDOMEN:  Epigastric incision healing well, no erythema or induration, soft, non-distended, non-tender, voluntary guarding absent,  and hernia absent  MUSCULOSKELETAL: No edema  NEUROLOGIC:  Mental Status Exam:  Level of Alertness:   awake  Orientation:   person, place, time  SKIN: left upper arm with both medial and lateral subcutaneous nodules consistent with lipomas, approximately 1.5 cm each; right upper lateral arm with 1 cm subcutaneous mass and right forearm with 1 cm subcutaneous mass, consistent with lipoma      Pathology:  FINAL DIAGNOSIS:       A. Epigastric hernia contents, excision:     - Fibromembranous tissue with mesothelial lining consistent with       hernia sac.     B. Lipoma, right thigh, excision:     - Mature adipose tissue, compatible with lipoma.     C. Lipoma, left thigh, excision:     - Mature adipose tissue, compatible with lipoma (see comment).     D. Lipoma x2, left lower chest wall, excision:     - Mature adipose tissue, compatible with lipoma.     E. Lipoma, left elbow, excision:     - Mature adipose tissue, compatible with lipoma (see comment).     F. Lipoma, right lower back, excision:     - Mature adipose tissue, compatible with lipoma.     G.  Lipoma, right elbow, excision:     - Mature adipose tissue, compatible with lipoma.     COMMENT:  Parts C procedure well. After discussing with the patient about the very low malignancy risk of any of these lipomas, we elected to not send these additional lipomas to pathology. Will plan on having her follow up prn. OK to return to water aerobics in 2 weeks.     Electronically signed by Jasmeet Cool MD on 8/17/2018 at 10:23 AM

## 2018-08-17 NOTE — Clinical Note
Stanislaw Levine,  I just saw Ms. Kieran Swann back in the office to follow up her epigastric hernia repair along with lipoma excisions x 10. She also wanted to have 4 more lipomas removed today. She tolerated the procedure well. She is going to follow up with me as needed to check her incisions. Thank you for allowing me to take care of Ms. Jeimy Singleton with you.   Bobbi Daniels

## 2018-08-20 DIAGNOSIS — E55.9 VITAMIN D DEFICIENCY: ICD-10-CM

## 2018-08-20 NOTE — TELEPHONE ENCOUNTER
Last visit:  7/16  Next visit not scheduled:  Dr. Sayda Trivedi notes said to return ~1/16/19    Count changed to 30 with no refills, but normally receives 90 day supply.

## 2018-08-21 RX ORDER — CHOLECALCIFEROL (VITAMIN D3) 125 MCG
CAPSULE ORAL
Qty: 30 TABLET | Refills: 0 | Status: SHIPPED | OUTPATIENT
Start: 2018-08-21 | End: 2018-09-04

## 2018-09-02 DIAGNOSIS — E55.9 VITAMIN D DEFICIENCY: ICD-10-CM

## 2018-09-04 RX ORDER — CHOLECALCIFEROL (VITAMIN D3) 125 MCG
CAPSULE ORAL
Qty: 30 TABLET | Refills: 11 | Status: SHIPPED | OUTPATIENT
Start: 2018-09-04 | End: 2018-12-27

## 2018-09-06 ENCOUNTER — TELEPHONE (OUTPATIENT)
Dept: INTERNAL MEDICINE CLINIC | Age: 43
End: 2018-09-06

## 2018-09-06 DIAGNOSIS — M79.7 FIBROMYALGIA: Primary | ICD-10-CM

## 2018-09-12 DIAGNOSIS — M79.7 FIBROMYALGIA: ICD-10-CM

## 2018-09-12 RX ORDER — NAPROXEN 500 MG/1
TABLET ORAL
Qty: 60 TABLET | Refills: 3 | Status: SHIPPED | OUTPATIENT
Start: 2018-09-12 | End: 2018-11-12

## 2018-09-25 ENCOUNTER — HOSPITAL ENCOUNTER (OUTPATIENT)
Dept: PHYSICAL THERAPY | Age: 43
Setting detail: THERAPIES SERIES
Discharge: HOME | End: 2018-09-25
Payer: MEDICAID

## 2018-09-25 PROCEDURE — 97113 AQUATIC THERAPY/EXERCISES: CPT

## 2018-09-25 PROCEDURE — 97150 GROUP THERAPEUTIC PROCEDURES: CPT

## 2018-09-25 NOTE — FLOWSHEET NOTE
Physical Therapy Aquatic Flow Sheet   Date:  2018    Patient Name:  Safia Telles    :  1975     Restrictions/Precautions:    Pertinent Medical History: anxiety, depression  Medical/Treatment Diagnosis Information:  · Diagnosis: fibromyalgia  · Treatment Diagnosis: pain, tightness, dec ROM  Insurance/Certification information:  PT Insurance Information: [facility]  Physician Information:  Referring Practitioner: Dr. Wilma Kirby of care signed (Y/N): Faxed at eval    Visit# / total visits:  3/8 -  Gcode at 10  Pain level:      3-6/10         Progress Note: []  Yes  [x]  No  Next due by: Visit #10:      History of Injury: Pt had multiple surgeries the end of July to remove lipomas and repair an epigastric hernia (, early August and ).   Currently pain is 5-6/10 worse in the morning but does also inc again in the evening.  Pain is all over in abdomen, UE, back and hips.  Pt also c/o pins and needles in UE and feet throughout the day.     Subjective:  C/o off L knee 6/10, 3/10 R ankle    Objective:  Observation:  See eval  Test measurements:      Key  B= Belt DB= Dumbells T= Theratube   G=Gloves N= Noodles W= Weights   P= Paddles WW=Water Walker K= Kickboard        Transfers:   steps ind      % Immersion: 75%            Ambulation:  laps ind Exercises UE:  B    Forwards 5 Shoulder Shrugs 10x    Lateral 2  Shoulder circles 10    Marching    Scapular Retraction  10    Retro   Rolling  10    [place]  Push Downs 10     IR/ER 10     Punching 10x   Stretching: B 30 sec  Rowing 10   Gastroc/Soleus  2 Elbow Flex/Ext 10   Hamstring  2 Shldr Flex/Ext 10    Knee flex stretch   Shldr Abd/Add 10   Piriformis   Horiz Abd/Add 10   Hip flexor    Arm Circles 10    SKTC   PNF Diagonals 10   DKTC  UE oscillations f/b, s/s    ITB   Wall Push-ups    Quad  Figure 8's    Mid back   Buoy pull/push downs    UT  Tband rows    Rhom  Tband lats    Post Shoulder  Lumbar Rot w/ paddles    Pec   Small ball pull downs                   diagonals             Cervical:       AROM Flex       AROM Extension     LEs exercises:  B AROM Side Bending    Marching  10 AROM Rotation    Heel Raises  10 Chin tucks    Toe Raises 10 Chin nods     Squats  10      Hamstring Curls  10      Hip Flexion  10 Balance:      Hip Abduction 10 SLS    Hip Circles 10 Tandem stance    TA set 10 NBOS eyes open    Glut Set 10 NBOS eyes closed    Hip Extension  Hand to Opposite Knee 10   Hip Adduction    Box Step     Hip IR   Noodle Stance     Hip ER  Stop/Go Gait    Fig 8's  Switch Gait                Seated: B Functional:    Ankle Pumps 20 Step up forward    Ankle circles 10 Step up lateral    Knee flex & ext 20 Step down    Hip Abd & Adduction 20 [place] squats    Bicycle  20 Crate Lifts    Add Set with ball 10 Lunges forward    LX stab with med ball throws  Lunges lateral    Ankle INV  Lunges retro    Ankle EV  Lower ab curl with noodle      Upper ab curl with ball      Med ball straight lifts      Med ball diagonal lifts      Hydrorider          Noodle:      Leg Press  Deep Water:     hang at wall 1 min   relief Jog    Noodle hang deep water  Jumping Jacks    Noodle Bicycle  Heel to toe      Hand to opposite knee      Cross country skier      Rocking Horse        Timed Code Treatment Minutes:  35    Total Treatment Minutes:  35    Treatment/Activity Tolerance:  [x] Patient tolerated treatment well [] Patient limited by fatique  [] Patient limited by pain  [] Patient limited by other medical complications  [] Other:     Prognosis: [x] Good [] Fair  [] Poor    Patient Requires Follow-up: [x] Yes  [] No    Plan:   [x] Continue per plan of care [] Alter current plan (see comments)  [] Plan of care initiated [] Hold pending MD visit [] Discharge    Plan for Next Session:  Increase gait, seated ex     Electronically signed by: Nicole Watkins,

## 2018-09-27 ENCOUNTER — HOSPITAL ENCOUNTER (OUTPATIENT)
Dept: PHYSICAL THERAPY | Age: 43
Setting detail: THERAPIES SERIES
Discharge: HOME | End: 2018-09-27
Payer: MEDICAID

## 2018-09-27 PROCEDURE — 97150 GROUP THERAPEUTIC PROCEDURES: CPT

## 2018-09-27 PROCEDURE — 97113 AQUATIC THERAPY/EXERCISES: CPT

## 2018-10-02 ENCOUNTER — HOSPITAL ENCOUNTER (OUTPATIENT)
Dept: PHYSICAL THERAPY | Age: 43
Setting detail: THERAPIES SERIES
Discharge: HOME | End: 2018-10-02
Payer: MEDICAID

## 2018-10-02 PROCEDURE — 97113 AQUATIC THERAPY/EXERCISES: CPT

## 2018-10-02 PROCEDURE — 97150 GROUP THERAPEUTIC PROCEDURES: CPT

## 2018-10-03 ENCOUNTER — TELEPHONE (OUTPATIENT)
Dept: INTERNAL MEDICINE CLINIC | Age: 43
End: 2018-10-03

## 2018-10-03 DIAGNOSIS — C50.011 MALIGNANT NEOPLASM OF AREOLA OF RIGHT BREAST IN FEMALE, UNSPECIFIED ESTROGEN RECEPTOR STATUS (HCC): Primary | ICD-10-CM

## 2018-10-04 ENCOUNTER — HOSPITAL ENCOUNTER (OUTPATIENT)
Dept: PHYSICAL THERAPY | Age: 43
Setting detail: THERAPIES SERIES
Discharge: HOME | End: 2018-10-04
Payer: MEDICAID

## 2018-10-04 PROCEDURE — 97113 AQUATIC THERAPY/EXERCISES: CPT

## 2018-10-04 PROCEDURE — 97150 GROUP THERAPEUTIC PROCEDURES: CPT

## 2018-10-08 ENCOUNTER — TELEPHONE (OUTPATIENT)
Dept: INTERNAL MEDICINE CLINIC | Age: 43
End: 2018-10-08

## 2018-10-09 ENCOUNTER — HOSPITAL ENCOUNTER (OUTPATIENT)
Dept: PHYSICAL THERAPY | Age: 43
Setting detail: THERAPIES SERIES
Discharge: HOME | End: 2018-10-09
Payer: MEDICAID

## 2018-10-09 PROCEDURE — 97150 GROUP THERAPEUTIC PROCEDURES: CPT

## 2018-10-09 PROCEDURE — 97530 THERAPEUTIC ACTIVITIES: CPT

## 2018-10-09 PROCEDURE — 97113 AQUATIC THERAPY/EXERCISES: CPT

## 2018-10-09 NOTE — FLOWSHEET NOTE
Physical Therapy Daily Treatment Note  Date:  10/9/2018    Patient Name:  Willie Hanks    :  1975  MRN: [id number]  Restrictions/Precautions:    Pertinent Medical History: anxiety, depression  Medical/Treatment Diagnosis Information:  · Diagnosis: fibromyalgia  · Treatment Diagnosis: pain, tightness, dec ROM  Insurance/Certification information:  PT Insurance Information: Fany Oconnor  Physician Information:  Referring Practitioner: Dr. Lydia Sicard of care signed (Y/N): Faxed at eval  Visit# / total visits:   -  Gcode at 10  Pain level: 6/10     G-Code (if applicable):      Date / Visit # G-Code Applied:  /  PT G-Codes  Functional Assessment Tool Used: LEFS  Score: 38/40-59%    Progress Note: [x]  Yes  []  No  Next due by: Visit #10      History of Injury: Pt had multiple surgeries the end of July to remove lipomas and repair an epigastric hernia (, early August and ). Currently pain is 5-6/10 worse in the morning but does also inc again in the evening. Pain is all over in abdomen, UE, back and hips. Pt also c/o pins and needles in UE and feet throughout the day.     Subjective:     Progress Note ( 10/9/18)  * feels stronger, more agile and pain level is better also  * was initially sore with all of the new exercises, but that has improved somewhat, but still needs to take it easy the days after pool exercises  * no MD f/u appt scheduled at this time  * overall pain is 25- 30% improved with good and bad days  * devin 60 min in pool  * has c/o PF that pool therapist has been showing pt stretches and edu on icing; PT issued info on Powerstep inserts   * ADLs improving  * B UE flex all WNL with only mild pulling R chest area - to have diagnostic mammogram for R breast  * IR 5-/5; ER 5/5 no c/o   * Discussed in depth pt's stressors at home and ways to help this        Objective:  Observation:   Test measurements:      Exercises:  Exercise/Equipment Resistance/Repetitions Other comments   Pt

## 2018-10-09 NOTE — FLOWSHEET NOTE
Shoulder  Lumbar Rot w/ paddles    Pec x30 sec   Small ball pull downs                   diagonals             Cervical:       AROM Flex       AROM Extension     LEs exercises:  B AROM Side Bending    Marching  10 AROM Rotation    Heel Raises  10 Chin tucks    Toe Raises 10 Chin nods     Squats  10      Hamstring Curls  10      Hip Flexion  10 Balance:  B   Hip Abduction 10 SLS 15 sec   Hip Circles 10 Tandem stance 30 sec    TA set 10 NBOS eyes open    Glut Set 10 NBOS eyes closed    Hip Extension  Hand to Opposite Knee 10   Hip Adduction    Box Step  3 R/L   Hip IR   Noodle Stance     Hip ER  Stop/Go Gait    Fig 8's  Switch Gait                Seated: B Functional: B   Ankle Pumps 30 Step up forward 10   Ankle circles 10 Step up lateral    Knee flex & ext 30 Step down    Hip Abd & Adduction 30 Marrero squats 10   Bicycle  30 Crate Lifts    Add Set with ball 10 Lunges forward    LX stab with med ball throws  Lunges lateral    Ankle INV  Lunges retro    Ankle EV  Lower ab curl with noodle      Upper ab curl with ball      Med ball straight lifts      Med ball diagonal lifts      Hydrorider          Noodle:      Leg Press  Deep Water:     hang at wall  Jog    Noodle hang deep water x5 mins Jumping Jacks    Noodle Bicycle  Heel to toe      Hand to opposite knee      Cross country skier      Rocking Horse        Timed Code Treatment Minutes:  60    Total Treatment Minutes:  60    Treatment/Activity Tolerance:  [x] Patient tolerated treatment well [] Patient limited by fatique  [] Patient limited by pain  [] Patient limited by other medical complications  [x] Other:  Pain 0/10 after aquatic ex.      Prognosis: [x] Good [] Fair  [] Poor    Patient Requires Follow-up: [x] Yes  [] No    Plan:   [x] Continue per plan of care [] Alter current plan (see comments)  [] Plan of care initiated [] Hold pending MD visit [] Discharge    Plan for Next Session:  Add GSS stretch off step    Electronically signed by: Cristhian Granados

## 2018-10-11 ENCOUNTER — HOSPITAL ENCOUNTER (OUTPATIENT)
Dept: PHYSICAL THERAPY | Age: 43
Setting detail: THERAPIES SERIES
Discharge: HOME | End: 2018-10-11
Payer: MEDICAID

## 2018-10-11 NOTE — FLOWSHEET NOTE
Physical Therapy  Cancellation/No-show Note  Patient Name:  Raffi Saravia  :  1975   Date:  10/11/2018  Cancelled visits to date: 1  No-shows to date: 0    For today's appointment patient:  [x]  Cancelled  []  Rescheduled appointment  []  No-show     Reason given by patient:  [x]  Patient ill  []  Conflicting appointment  []  No transportation    []  Conflict with work  []  No reason given  []  Other:     Comments:      Electronically signed by:  Nancy James, [id number] Gir[address]ardenas

## 2018-10-16 ENCOUNTER — HOSPITAL ENCOUNTER (OUTPATIENT)
Dept: PHYSICAL THERAPY | Age: 43
Setting detail: THERAPIES SERIES
Discharge: HOME | End: 2018-10-16
Payer: MEDICAID

## 2018-10-16 ENCOUNTER — TELEPHONE (OUTPATIENT)
Dept: INTERNAL MEDICINE CLINIC | Age: 43
End: 2018-10-16

## 2018-10-16 DIAGNOSIS — N64.4 BREAST PAIN, RIGHT: Primary | ICD-10-CM

## 2018-10-16 PROCEDURE — 97150 GROUP THERAPEUTIC PROCEDURES: CPT

## 2018-10-16 PROCEDURE — 97113 AQUATIC THERAPY/EXERCISES: CPT

## 2018-10-16 NOTE — TELEPHONE ENCOUNTER
The order in [facility] for diagnostic mammogram - the dx means she has a breast implant, which she does not. The dx needs to be changed and resubmitted ASAP. The patient is having a lot pain.

## 2018-10-16 NOTE — FLOWSHEET NOTE
Physical Therapy Aquatic Flow Sheet   Date:  10/16/2018    Patient Name:  Efrain Francisco    :  1975     Restrictions/Precautions:    Pertinent Medical History: anxiety, depression  Medical/Treatment Diagnosis Information:  · Diagnosis: fibromyalgia  · Treatment Diagnosis: pain, tightness, dec ROM  Insurance/Certification information:  PT Insurance Information: [facility]  Physician Information:  Referring Practitioner: Dr. Olivia Goldberg of care signed (Y/N): Faxed at eval    Visit# / total visits:   -  Gcode at 10  Pain level:     6/10         Progress Note: []  Yes  [x]  No  Next due by: Visit #10:      History of Injury: Pt had multiple surgeries the end of July to remove lipomas and repair an epigastric hernia (, early August and ).  Currently pain is 5-6/10 worse in the morning but does also inc again in the evening.  Pain is all over in abdomen, UE, back and hips.  Pt also c/o pins and needles in UE and feet throughout the day.     Subjective:   Had some increased pain since having to miss appt on Thursday. Pt does state that she did some activities though over the weekend that she normally doesn't do since it was her birthday (dancing).     Objective:  Observation:  See eval  Test measurements:      Key  B= Belt DB= Dumbells T= Theratube   G=Gloves N= Noodles W= Weights   P= Paddles WW=Water Walker K= Kickboard        Transfers:   steps ind      % Immersion: 75%            Ambulation:  laps ind Exercises UE:  B    Forwards 7 Shoulder Shrugs 10    Lateral 3 Shoulder circles 10    Marching 2 Scapular Retraction  10    Retro   Rolling  10    Cariocas  Push Downs 10     IR/ER 10     Punching 10   Stretching: B 30 sec  Rowing 10   Gastroc/Soleus  2 Elbow Flex/Ext 10   Hamstring  2 Shldr Flex/Ext 10    Knee flex stretch   Shldr Abd/Add 10   Piriformis   Horiz Abd/Add 10   Hip flexor    Arm Circles 10    SKTC   PNF Diagonals 10   DKTC 1 min    relief UE oscillations f/b, s/s    ITB   Wall pending MD visit [] Discharge    Plan for Next Session:  Add lateral step ups    Electronically signed by: Chelsey Paula, [id number] Gir[address] Cardenas

## 2018-10-18 ENCOUNTER — HOSPITAL ENCOUNTER (OUTPATIENT)
Dept: PHYSICAL THERAPY | Age: 43
Setting detail: THERAPIES SERIES
Discharge: HOME | End: 2018-10-18
Payer: MEDICAID

## 2018-10-18 PROCEDURE — 97113 AQUATIC THERAPY/EXERCISES: CPT

## 2018-10-18 PROCEDURE — 97150 GROUP THERAPEUTIC PROCEDURES: CPT

## 2018-10-23 ENCOUNTER — HOSPITAL ENCOUNTER (OUTPATIENT)
Dept: PHYSICAL THERAPY | Age: 43
Setting detail: THERAPIES SERIES
Discharge: HOME | End: 2018-10-23
Payer: MEDICAID

## 2018-10-23 ENCOUNTER — HOSPITAL ENCOUNTER (OUTPATIENT)
Dept: WOMENS IMAGING | Age: 43
Discharge: HOME | End: 2018-10-23
Payer: MEDICAID

## 2018-10-23 DIAGNOSIS — N64.4 BREAST PAIN, RIGHT: ICD-10-CM

## 2018-10-23 PROCEDURE — G0279 TOMOSYNTHESIS, MAMMO: HCPCS

## 2018-10-23 PROCEDURE — G8978 MOBILITY CURRENT STATUS: HCPCS

## 2018-10-23 PROCEDURE — G8979 MOBILITY GOAL STATUS: HCPCS

## 2018-10-23 PROCEDURE — 97150 GROUP THERAPEUTIC PROCEDURES: CPT

## 2018-10-23 PROCEDURE — 97113 AQUATIC THERAPY/EXERCISES: CPT

## 2018-10-25 ENCOUNTER — HOSPITAL ENCOUNTER (OUTPATIENT)
Dept: PHYSICAL THERAPY | Age: 43
Setting detail: THERAPIES SERIES
Discharge: HOME | End: 2018-10-25
Payer: MEDICAID

## 2018-10-25 PROCEDURE — 97150 GROUP THERAPEUTIC PROCEDURES: CPT

## 2018-10-25 PROCEDURE — 97113 AQUATIC THERAPY/EXERCISES: CPT

## 2018-10-25 NOTE — FLOWSHEET NOTE
Physical Therapy Aquatic Flow Sheet   Date:  10/25/2018    Patient Name:  Shanna Metz    :  1975     Restrictions/Precautions:    Pertinent Medical History: anxiety, depression  Medical/Treatment Diagnosis Information:  · Diagnosis: fibromyalgia  · Treatment Diagnosis: pain, tightness, dec ROM  Insurance/Certification information:  PT Insurance Information: [facility]  Physician Information:  Referring Practitioner: Dr. Ackerman List of care signed (Y/N): Faxed at eval    Visit# / total visits:   -  Gcode at 10  Pain level:     2/10         Progress Note: []  Yes  [x]  No  Next due by: Visit #10:      History of Injury: Pt had multiple surgeries the end of July to remove lipomas and repair an epigastric hernia (, early August and ).   Currently pain is 5-6/10 worse in the morning but does also inc again in the evening.  Pain is all over in abdomen, UE, back and hips.  Pt also c/o pins and needles in UE and feet throughout the day.     Subjective:  Feeling stronger    Objective:  Observation:  See eval  Test measurements:      Key  B= Belt DB= Dumbells T= Theratube   G=Gloves N= Noodles W= Weights   P= Paddles WW=Water Walker K= Kickboard        Transfers:   steps ind      % Immersion: 75%            Ambulation:  laps ind Exercises UE:  B    Forwards 8 Shoulder Shrugs 10    Lateral 4 Shoulder circles 10    Marching 2 Scapular Retraction  10    Retro   Rolling  10    Cariocas  Push Downs 10     IR/ER 10     Punching 10   Stretching: B 30 sec  Rowing 10   Gastroc/Soleus  GSS off step  2  1 min Elbow Flex/Ext 10   Hamstring  2 Shldr Flex/Ext 10    Knee flex stretch   Shldr Abd/Add 10   Piriformis  2 Horiz Abd/Add 10   Hip flexor    Arm Circles 10    SKTC   PNF Diagonals 10   DKTC 1 min    relief UE oscillations f/b, s/s    ITB   Wall Push-ups    Quad  Figure 8's    Mid back  30 sec Buoy pull/push downs    UT Tband rows    Rhom x30 sec Tband lats    Post Shoulder  Lumbar Rot w/ paddles

## 2018-10-29 DIAGNOSIS — N64.4 BREAST PAIN, RIGHT: Primary | ICD-10-CM

## 2018-10-30 ENCOUNTER — HOSPITAL ENCOUNTER (OUTPATIENT)
Dept: PHYSICAL THERAPY | Age: 43
Setting detail: THERAPIES SERIES
Discharge: HOME | End: 2018-10-30
Payer: MEDICAID

## 2018-10-30 ENCOUNTER — HOSPITAL ENCOUNTER (OUTPATIENT)
Dept: ULTRASOUND IMAGING | Age: 43
Discharge: HOME | End: 2018-10-30
Payer: MEDICAID

## 2018-10-30 DIAGNOSIS — N64.4 BREAST PAIN, RIGHT: ICD-10-CM

## 2018-10-30 PROCEDURE — 97113 AQUATIC THERAPY/EXERCISES: CPT

## 2018-10-30 PROCEDURE — 97150 GROUP THERAPEUTIC PROCEDURES: CPT

## 2018-10-30 PROCEDURE — 76642 ULTRASOUND BREAST LIMITED: CPT

## 2018-10-30 NOTE — FLOWSHEET NOTE
Physical Therapy Aquatic Flow Sheet   Date:  10/30/2018    Patient Name:  Adal Whitman    :  1975     Restrictions/Precautions:    Pertinent Medical History: anxiety, depression  Medical/Treatment Diagnosis Information:  · Diagnosis: fibromyalgia  · Treatment Diagnosis: pain, tightness, dec ROM  Insurance/Certification information:  PT Insurance Information: Aleks Frazier  Physician Information:  Referring Practitioner: Dr. Steph Lange of care signed (Y/N): Faxed at eval    Visit# / total visits:   -  Gcode at 10  Pain level:     2/10         Progress Note: []  Yes  [x]  No  Next due by: Visit #10:      History of Injury: Pt had multiple surgeries the end of July to remove lipomas and repair an epigastric hernia (, early August and ).  Currently pain is 5-6/10 worse in the morning but does also inc again in the evening.  Pain is all over in abdomen, UE, back and hips.  Pt also c/o pins and needles in UE and feet throughout the day.     Subjective:  Had a good weekend.   To have US of R breast.    Objective:  Observation:  See eval  Test measurements:      Key  B= Belt DB= Dumbells T= Theratube   G=Gloves N= Noodles W= Weights   P= Paddles WW=Water Walker K= Kickboard        Transfers:   steps ind      % Immersion: 75%            Ambulation:  laps ind Exercises UE:  B    Forwards 8 Shoulder Shrugs 10    Lateral 4 Shoulder circles 10    Marching 2 Scapular Retraction  10    Retro   Rolling  10    Cariocas  Push Downs 10     IR/ER 10     Punching 10   Stretching: B 30 sec  Rowing 10   Gastroc/Soleus  GSS off step  2   Elbow Flex/Ext 10   Hamstring  2 Shldr Flex/Ext 10    Knee flex stretch   Shldr Abd/Add 10   Piriformis  Horiz Abd/Add 10   Hip flexor    Arm Circles 10    SKTC   PNF Diagonals 10   DKTC 1 min    relief UE oscillations f/b, s/s    ITB   Wall Push-ups    Quad  Figure 8's 10   Mid back  30 sec Buoy pull/push downs    UT Tband rows    Rhom x30 sec Tband lats    Post Shoulder

## 2018-11-01 ENCOUNTER — HOSPITAL ENCOUNTER (OUTPATIENT)
Dept: PHYSICAL THERAPY | Age: 43
Setting detail: THERAPIES SERIES
Discharge: HOME | End: 2018-11-01
Payer: MEDICAID

## 2018-11-01 PROCEDURE — 97113 AQUATIC THERAPY/EXERCISES: CPT

## 2018-11-01 PROCEDURE — 97150 GROUP THERAPEUTIC PROCEDURES: CPT

## 2018-11-01 NOTE — FLOWSHEET NOTE
Physical Therapy Aquatic Flow Sheet   Date:  2018    Patient Name:  Eleazar Odom    :  1975     Restrictions/Precautions:    Pertinent Medical History: anxiety, depression  Medical/Treatment Diagnosis Information:  · Diagnosis: fibromyalgia  · Treatment Diagnosis: pain, tightness, dec ROM  Insurance/Certification information:  PT Insurance Information: [employer] Hernandez  Physician Information:  Referring Practitioner: Dr. Isidro Yost of care signed (Y/N): Faxed at eval    Visit# / total visits:   +8    Pain level:     2/10         Progress Note: []  Yes  [x]  No  Next due by: Visit #10:      History of Injury: Pt had multiple surgeries the end of July to remove lipomas and repair an epigastric hernia (, early August and ).  Currently pain is 5-6/10 worse in the morning but does also inc again in the evening.  Pain is all over in abdomen, UE, back and hips.  Pt also c/o pins and needles in UE and feet throughout the day.     Subjective:  US showed lymph node in R breast.  Some soreness x 1 day after last Rx.       Objective:  Observation:  See eval  Test measurements:      Key  B= Belt DB= Dumbells T= Theratube   G=Gloves N= Noodles W= Weights   P= Paddles WW=Water Walker K= Kickboard        Transfers:   steps ind      % Immersion: 75%            Ambulation:  laps ind Exercises UE:  B    Forwards 4 Shoulder Shrugs 10    Lateral 2 Shoulder circles 10    Marching 1 Scapular Retraction  10    Retro   Rolling  10    Cariocas  Push Downs 10     IR/ER 10     Punching 10   Stretching: B 30 sec  Rowing 10   Gastroc/Soleus  GSS off step  2   Elbow Flex/Ext 10   Hamstring  2 Shldr Flex/Ext 10    Knee flex stretch   Shldr Abd/Add 10   Piriformis  2 Horiz Abd/Add 10   Hip flexor    Arm Circles 10    SKTC   PNF Diagonals 10   DKTC 1 min    relief UE oscillations f/b, s/s    ITB   Wall Push-ups    Quad  Figure 8's 10   Mid back  30 sec Buoy pull/push downs    UT Tband rows    Rhom x30 sec Tband lats

## 2018-11-06 ENCOUNTER — HOSPITAL ENCOUNTER (OUTPATIENT)
Dept: PHYSICAL THERAPY | Age: 43
Setting detail: THERAPIES SERIES
Discharge: HOME | End: 2018-11-06
Payer: MEDICAID

## 2018-11-08 ENCOUNTER — HOSPITAL ENCOUNTER (OUTPATIENT)
Dept: PHYSICAL THERAPY | Age: 43
Setting detail: THERAPIES SERIES
Discharge: HOME | End: 2018-11-08
Payer: MEDICAID

## 2018-11-08 PROCEDURE — 97530 THERAPEUTIC ACTIVITIES: CPT

## 2018-11-08 PROCEDURE — 97150 GROUP THERAPEUTIC PROCEDURES: CPT

## 2018-11-08 PROCEDURE — 97113 AQUATIC THERAPY/EXERCISES: CPT

## 2018-11-12 ENCOUNTER — ANESTHESIA EVENT (OUTPATIENT)
Dept: ENDOSCOPY | Age: 43
End: 2018-11-12
Payer: MEDICAID

## 2018-11-13 ENCOUNTER — HOSPITAL ENCOUNTER (OUTPATIENT)
Dept: PHYSICAL THERAPY | Age: 43
Setting detail: THERAPIES SERIES
Discharge: HOME | End: 2018-11-13
Payer: MEDICAID

## 2018-11-13 PROCEDURE — 97113 AQUATIC THERAPY/EXERCISES: CPT

## 2018-11-13 PROCEDURE — 97150 GROUP THERAPEUTIC PROCEDURES: CPT

## 2018-11-15 ENCOUNTER — ANESTHESIA (OUTPATIENT)
Dept: ENDOSCOPY | Age: 43
End: 2018-11-15
Payer: MEDICAID

## 2018-11-15 ENCOUNTER — HOSPITAL ENCOUNTER (OUTPATIENT)
Age: 43
Setting detail: OUTPATIENT SURGERY
Discharge: HOME | End: 2018-11-15
Attending: INTERNAL MEDICINE | Admitting: INTERNAL MEDICINE
Payer: MEDICAID

## 2018-11-15 ENCOUNTER — HOSPITAL ENCOUNTER (OUTPATIENT)
Dept: PHYSICAL THERAPY | Age: 43
Setting detail: THERAPIES SERIES
End: 2018-11-15
Payer: MEDICAID

## 2018-11-15 VITALS
WEIGHT: 189 LBS | HEART RATE: 87 BPM | BODY MASS INDEX: 29.66 KG/M2 | HEIGHT: 67 IN | RESPIRATION RATE: 16 BRPM | SYSTOLIC BLOOD PRESSURE: 132 MMHG | TEMPERATURE: 97.3 F | DIASTOLIC BLOOD PRESSURE: 92 MMHG | OXYGEN SATURATION: 100 %

## 2018-11-15 VITALS — DIASTOLIC BLOOD PRESSURE: 75 MMHG | SYSTOLIC BLOOD PRESSURE: 115 MMHG | OXYGEN SATURATION: 100 %

## 2018-11-15 PROCEDURE — 6360000002 HC RX W HCPCS: Performed by: NURSE ANESTHETIST, CERTIFIED REGISTERED

## 2018-11-15 PROCEDURE — 3700000000 HC ANESTHESIA ATTENDED CARE: Performed by: INTERNAL MEDICINE

## 2018-11-15 PROCEDURE — 7100000011 HC PHASE II RECOVERY - ADDTL 15 MIN: Performed by: INTERNAL MEDICINE

## 2018-11-15 PROCEDURE — 2500000003 HC RX 250 WO HCPCS: Performed by: NURSE ANESTHETIST, CERTIFIED REGISTERED

## 2018-11-15 PROCEDURE — 2580000003 HC RX 258: Performed by: ANESTHESIOLOGY

## 2018-11-15 PROCEDURE — 7100000010 HC PHASE II RECOVERY - FIRST 15 MIN: Performed by: INTERNAL MEDICINE

## 2018-11-15 PROCEDURE — 3700000001 HC ADD 15 MINUTES (ANESTHESIA): Performed by: INTERNAL MEDICINE

## 2018-11-15 PROCEDURE — 3609009500 HC COLONOSCOPY DIAGNOSTIC OR SCREENING: Performed by: INTERNAL MEDICINE

## 2018-11-15 RX ORDER — MORPHINE SULFATE 4 MG/ML
2 INJECTION, SOLUTION INTRAMUSCULAR; INTRAVENOUS EVERY 5 MIN PRN
Status: DISCONTINUED | OUTPATIENT
Start: 2018-11-15 | End: 2018-11-15 | Stop reason: HOSPADM

## 2018-11-15 RX ORDER — PROPOFOL 10 MG/ML
INJECTION, EMULSION INTRAVENOUS PRN
Status: DISCONTINUED | OUTPATIENT
Start: 2018-11-15 | End: 2018-11-15 | Stop reason: SDUPTHER

## 2018-11-15 RX ORDER — MEPERIDINE HYDROCHLORIDE 25 MG/ML
12.5 INJECTION INTRAMUSCULAR; INTRAVENOUS; SUBCUTANEOUS EVERY 5 MIN PRN
Status: DISCONTINUED | OUTPATIENT
Start: 2018-11-15 | End: 2018-11-15 | Stop reason: HOSPADM

## 2018-11-15 RX ORDER — FENTANYL CITRATE 50 UG/ML
25 INJECTION, SOLUTION INTRAMUSCULAR; INTRAVENOUS EVERY 5 MIN PRN
Status: DISCONTINUED | OUTPATIENT
Start: 2018-11-15 | End: 2018-11-15 | Stop reason: HOSPADM

## 2018-11-15 RX ORDER — SODIUM CHLORIDE 0.9 % (FLUSH) 0.9 %
10 SYRINGE (ML) INJECTION EVERY 12 HOURS SCHEDULED
Status: DISCONTINUED | OUTPATIENT
Start: 2018-11-15 | End: 2018-11-15 | Stop reason: HOSPADM

## 2018-11-15 RX ORDER — OXYCODONE HYDROCHLORIDE AND ACETAMINOPHEN 5; 325 MG/1; MG/1
1 TABLET ORAL PRN
Status: DISCONTINUED | OUTPATIENT
Start: 2018-11-15 | End: 2018-11-15 | Stop reason: HOSPADM

## 2018-11-15 RX ORDER — SODIUM CHLORIDE 9 MG/ML
INJECTION, SOLUTION INTRAVENOUS CONTINUOUS
Status: DISCONTINUED | OUTPATIENT
Start: 2018-11-15 | End: 2018-11-15 | Stop reason: HOSPADM

## 2018-11-15 RX ORDER — MORPHINE SULFATE 4 MG/ML
1 INJECTION, SOLUTION INTRAMUSCULAR; INTRAVENOUS EVERY 5 MIN PRN
Status: DISCONTINUED | OUTPATIENT
Start: 2018-11-15 | End: 2018-11-15 | Stop reason: HOSPADM

## 2018-11-15 RX ORDER — ONDANSETRON 2 MG/ML
4 INJECTION INTRAMUSCULAR; INTRAVENOUS
Status: DISCONTINUED | OUTPATIENT
Start: 2018-11-15 | End: 2018-11-15 | Stop reason: HOSPADM

## 2018-11-15 RX ORDER — OXYCODONE HYDROCHLORIDE AND ACETAMINOPHEN 5; 325 MG/1; MG/1
2 TABLET ORAL PRN
Status: DISCONTINUED | OUTPATIENT
Start: 2018-11-15 | End: 2018-11-15 | Stop reason: HOSPADM

## 2018-11-15 RX ORDER — LIDOCAINE HYDROCHLORIDE 20 MG/ML
INJECTION, SOLUTION INFILTRATION; PERINEURAL PRN
Status: DISCONTINUED | OUTPATIENT
Start: 2018-11-15 | End: 2018-11-15 | Stop reason: SDUPTHER

## 2018-11-15 RX ORDER — SODIUM CHLORIDE 0.9 % (FLUSH) 0.9 %
10 SYRINGE (ML) INJECTION PRN
Status: DISCONTINUED | OUTPATIENT
Start: 2018-11-15 | End: 2018-11-15 | Stop reason: HOSPADM

## 2018-11-15 RX ORDER — FENTANYL CITRATE 50 UG/ML
50 INJECTION, SOLUTION INTRAMUSCULAR; INTRAVENOUS EVERY 5 MIN PRN
Status: DISCONTINUED | OUTPATIENT
Start: 2018-11-15 | End: 2018-11-15 | Stop reason: HOSPADM

## 2018-11-15 RX ADMIN — PROPOFOL 200 MG: 10 INJECTION, EMULSION INTRAVENOUS at 09:55

## 2018-11-15 RX ADMIN — SODIUM CHLORIDE: 9 INJECTION, SOLUTION INTRAVENOUS at 09:40

## 2018-11-15 RX ADMIN — PROPOFOL 200 MG: 10 INJECTION, EMULSION INTRAVENOUS at 09:40

## 2018-11-15 RX ADMIN — LIDOCAINE HYDROCHLORIDE 50 MG: 20 INJECTION, SOLUTION INFILTRATION; PERINEURAL at 09:40

## 2018-11-15 ASSESSMENT — PAIN - FUNCTIONAL ASSESSMENT: PAIN_FUNCTIONAL_ASSESSMENT: 0-10

## 2018-11-15 ASSESSMENT — LIFESTYLE VARIABLES: SMOKING_STATUS: 0

## 2018-11-15 ASSESSMENT — PAIN SCALES - GENERAL
PAINLEVEL_OUTOF10: 0
PAINLEVEL_OUTOF10: 0

## 2018-11-15 NOTE — PROCEDURES
[address]                               COLONOSCOPY REPORT    PATIENT NAME: Andria Garcia                 :        1975  MED REC NO:   [id number]                          ROOM:  ACCOUNT NO:   [de-identified]                           ADMIT DATE: 11/15/2018  PROVIDER:     Pilar Herrera MD    DATE OF PROCEDURE:  11/15/2018    REFERRING PROVIDER:  Leeanne Gardner. Luis Piña MD    PATIENT HISTORY:  This is a 50-year-old female, outpatient. INSTRUMENT USED:  Olympus PCF-H190DL. MEDICATIONS OF PROCEDURE:  The patient was premedicated with Diprivan  intravenously as administered by the anesthesiology service. INDICATIONS:  The patient has presented with what appears to be  functional abdominal pain. She was found to have gastritis on upper  endoscopy, but has failed to improve with acid suppression, including  double-dose PPI therapy. A CT scan of the abdomen and pelvis was  unrevealing. She was seen by my partner in my absence in the office for  ongoing abdominal pain, which now seems to be lower in nature. Although, she related constipation to him, she denied it today to me. She is undergoing a colonoscopy to evaluate further. DESCRIPTION OF PROCEDURE:  The digital and anal exams were remarkable  for very large external hemorrhoids. The colonoscope was inserted to  the cecum with difficulty. The prep was only fair. A lesion such as a  small polyp or vascular ectasia could have been missed. At one point, I  thought that there was a diminutive ascending colon polyp visualized,  but the prep obscured the view further. I could not revisualize this or  attempt to remove it. It measured perhaps 3 to 4 mm in diameter. No  other mucosal lesions were identified. Retroflexed view in the rectum  confirmed the large external hemorrhoids. IMPRESSION:  1. Hemorrhoids.   2.  A marginal colonoscopy

## 2018-11-15 NOTE — H&P
[facility]   Pre-operative History and Physical    Patient: Jazzmine Wallace  : 1975  Acct#:         HISTORY OF PRESENT ILLNESS:    The patient is a 44 y.o. female  who presents with constipation, abdominal pain  Past Medical History:        Diagnosis Date    Anxiety     Chest wall pain     Constipation     Costochondritis     Depression     Fibroid, uterine     Fibromyalgia     Pleurisy     Sickle cell trait ([address] 2016     Past Surgical History:        Procedure Laterality Date    BREAST SURGERY      bx    HERNIA REPAIR  2018    epigastric hernia, lipomas of right thigh, left thigh, left chest wall x 2, left posterior arm, right posterior arm, right lower back    MOUTH SURGERY N/A 2016    wisdom teeth    MYOMECTOMY      OTHER SURGICAL HISTORY  1994    vaginal delivery     Medications Prior to Admission:   No current facility-administered medications on file prior to encounter. Current Outpatient Prescriptions on File Prior to Encounter   Medication Sig Dispense Refill    Cholecalciferol (VITAMIN D3) 2000 units TABS TAKE ONE TABLET BY MOUTH ONCE DAILY 30 tablet 11    omeprazole (PRILOSEC) 20 MG delayed release capsule Take 40 mg by mouth daily      pregabalin (LYRICA) 150 MG capsule Take 1 capsule by mouth 2 times daily for 30 days. . 60 capsule 2    DULoxetine (CYMBALTA) 60 MG extended release capsule Take cymbalta 30 mg daily for 1 month and then increase to 60 mg daily 30 capsule 5    Lactobacillus (PROBIOTIC ACIDOPHILUS PO) Take 1 capsule by mouth daily       magnesium oxide (MAG-OX) 400 MG tablet Take 400 mg by mouth daily      ferrous sulfate 325 (65 Fe) MG tablet Take 325 mg by mouth daily (with breakfast)      Alpha-Lipoic Acid 200 MG CAPS Take by mouth      methocarbamol (ROBAXIN) 500 MG tablet TAKE ONE TABLET BY MOUTH THREE TIMES DAILY AS NEEDED FOR MUSCLE PAIN OR SPASMS 90 tablet 5    polyethylene glycol (GLYCOLAX) powder MIX 17 GRAMS (1 CAPFUL) WITH 8 OZ OF FAVORITE LIQUID AND TAKE  BY MOUTH DAILY 1 Bottle 3    valACYclovir (VALTREX) 500 MG tablet Take 500 mg by mouth 3 times daily as needed       Allergies:  Penicillins    Social History:      Social History     Social History    Marital status:      Spouse name: N/A    Number of children: N/A    Years of education: N/A     Occupational History    Not on file. Social History Main Topics    Smoking status: Former Smoker     Packs/day: 0.25     Years: 15.00     Quit date: 4/2/2012    Smokeless tobacco: Never Used    Alcohol use Yes      Comment: social    Drug use: No    Sexual activity: Yes     Partners: Male     Other Topics Concern    Not on file     Social History Narrative    No narrative on file           Family History:   Family History   Problem Relation Age of Onset    High Blood Pressure Mother     Other Mother         fibromyalgia         PHYSICAL EXAM:      BP (!) 130/90   Pulse 97   Resp 18   Ht 5' 6.5\" (1.689 m)   Wt 189 lb (85.7 kg)   SpO2 100%   BMI 30.05 kg/m²  I        Heart: Normal    Lungs: Normal    Abdomen: Normal      ASA Grade: ASA 2 - Patient with mild systemic disease with no functional limitations    I (soft palate, uvula, fauces, tonsillar pillars visible)  ASSESSMENT AND PLAN:    1. Patient is a 44 y.o. female here for Colonoscopy  2. Procedure options, risks and benefits reviewed with patient who expresses understanding.
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
## 2018-11-20 ENCOUNTER — HOSPITAL ENCOUNTER (OUTPATIENT)
Dept: PHYSICAL THERAPY | Age: 43
Setting detail: THERAPIES SERIES
Discharge: HOME | End: 2018-11-20
Payer: MEDICAID

## 2018-11-20 PROCEDURE — 97150 GROUP THERAPEUTIC PROCEDURES: CPT

## 2018-11-20 PROCEDURE — 97113 AQUATIC THERAPY/EXERCISES: CPT

## 2018-11-20 NOTE — FLOWSHEET NOTE
Physical Therapy Aquatic Flow Sheet   Date:  2018    Patient Name:  Demetrio Molina    :  1975     Restrictions/Precautions:    Pertinent Medical History: anxiety, depression  Medical/Treatment Diagnosis Information:  · Diagnosis: fibromyalgia  · Treatment Diagnosis: pain, tightness, dec ROM  Insurance/Certification information:  PT Insurance Information: Kenya Melendez  Physician Information:  Referring Practitioner: Dr. Luna Lab of care signed (Y/N): Faxed at eval    Visit# / total visits:    Pain level:     2/10         Progress Note: []  Yes  [x]  No  Next due by: Visit #10:      History of Injury: Pt had multiple surgeries the end of July to remove lipomas and repair an epigastric hernia (, early August and ).   Currently pain is 5-6/10 worse in the morning but does also inc again in the evening.  Pain is all over in abdomen, UE, back and hips.  Pt also c/o pins and needles in UE and feet throughout the day.     Subjective:   Doing well      Objective:  Observation:  See eval  Test measurements:      Key  B= Belt DB= Dumbells T= Theratube   G=Gloves N= Noodles W= Weights   P= Paddles WW=Water Walker K= Kickboard        Transfers:   steps ind      % Immersion: 75%            Ambulation:  laps ind Exercises UE:  B    Forwards 2 Shoulder Shrugs 10    Lateral 2 Shoulder circles 10    Marching 1 Scapular Retraction  10    Retro  1 Rolling  10    Cariocas  Push Downs 10     IR/ER 10     Punching 10   Stretching: B 30 sec  Rowing 10   Gastroc/Soleus  GSS off step  2   Elbow Flex/Ext 10   Hamstring  2 Shldr Flex/Ext 10    Knee flex stretch   Shldr Abd/Add 10   Piriformis  2 Horiz Abd/Add 10   Hip flexor    Arm Circles 10    SKTC   PNF Diagonals 10   DKTC 1 min    relief UE oscillations f/b, s/s 10 sec ea   ITB   Wall Push-ups 10   Quad  Figure 8's 10   Mid back   Buoy pull/push downs    UT Tband rows    Rhom Tband lats    Post Shoulder Lumbar Rot w/ paddles    Pec Small ball pull

## 2018-11-27 ENCOUNTER — HOSPITAL ENCOUNTER (OUTPATIENT)
Dept: PHYSICAL THERAPY | Age: 43
Setting detail: THERAPIES SERIES
Discharge: HOME | End: 2018-11-27
Payer: MEDICAID

## 2018-11-27 DIAGNOSIS — M79.7 FIBROMYALGIA: ICD-10-CM

## 2018-11-27 NOTE — FLOWSHEET NOTE
Physical Therapy  Cancellation/No-show Note  Patient Name:  Lavon Nascimento  :  1975   Date:  2018  Cancelled visits to date: 3  No-shows to date: 0    For today's appointment patient:  [x]  Cancelled  []  Rescheduled appointment  []  No-show     Reason given by patient:  []  Patient ill  []  Conflicting appointment  []  No transportation    []  Conflict with work  []  No reason given  [x]  Other:     Comments:  menstruation    Electronically signed by:  Morenita Araujo,

## 2018-11-29 ENCOUNTER — APPOINTMENT (OUTPATIENT)
Dept: PHYSICAL THERAPY | Age: 43
End: 2018-11-29
Payer: MEDICAID

## 2018-12-04 ENCOUNTER — APPOINTMENT (OUTPATIENT)
Dept: PHYSICAL THERAPY | Age: 43
End: 2018-12-04
Payer: MEDICAID

## 2018-12-04 ENCOUNTER — HOSPITAL ENCOUNTER (OUTPATIENT)
Dept: PHYSICAL THERAPY | Age: 43
Setting detail: THERAPIES SERIES
Discharge: HOME | End: 2018-12-04
Payer: MEDICAID

## 2018-12-04 PROCEDURE — 97113 AQUATIC THERAPY/EXERCISES: CPT

## 2018-12-04 PROCEDURE — 97150 GROUP THERAPEUTIC PROCEDURES: CPT

## 2018-12-04 NOTE — FLOWSHEET NOTE
visit [] Discharge    Plan for Next Session:  Resume as tolerated.      Electronically signed by: Diya Garza, PTA  [id number]

## 2018-12-05 ENCOUNTER — TELEPHONE (OUTPATIENT)
Dept: INTERNAL MEDICINE CLINIC | Age: 43
End: 2018-12-05

## 2018-12-06 ENCOUNTER — HOSPITAL ENCOUNTER (OUTPATIENT)
Dept: PHYSICAL THERAPY | Age: 43
Setting detail: THERAPIES SERIES
Discharge: HOME | End: 2018-12-06
Payer: MEDICAID

## 2018-12-06 PROCEDURE — 97113 AQUATIC THERAPY/EXERCISES: CPT

## 2018-12-06 PROCEDURE — 97150 GROUP THERAPEUTIC PROCEDURES: CPT

## 2018-12-11 ENCOUNTER — HOSPITAL ENCOUNTER (OUTPATIENT)
Dept: PHYSICAL THERAPY | Age: 43
Setting detail: THERAPIES SERIES
Discharge: HOME | End: 2018-12-11
Payer: MEDICAID

## 2018-12-11 PROCEDURE — 97113 AQUATIC THERAPY/EXERCISES: CPT

## 2018-12-11 PROCEDURE — 97150 GROUP THERAPEUTIC PROCEDURES: CPT

## 2018-12-11 NOTE — FLOWSHEET NOTE
Physical Therapy  Cancellation/No-show Note  Patient Name:  Nitza Singleton  :  1975   Date:  2018  Cancelled visits to date: 4  No-shows to date: 0    For today's appointment patient:  [x]  Cancelled  []  Rescheduled appointment  []  No-show     Reason given by patient:  []  Patient ill  [x]  Conflicting appointment  []  No transportation    []  Conflict with work  []  No reason given  []  Other:     Comments:    Electronically signed by:  Rasta Harmon, [address]

## 2018-12-11 NOTE — FLOWSHEET NOTE
ball pull downs                   diagonals      Finger/ wrist AROM  All x 10       Cervical:       AROM Flex       AROM Extension     LEs exercises:  B AROM Side Bending    Marching  10 AROM Rotation    Heel Raises  10 Chin tucks    Toe Raises 10 Chin nods     Squats  10      Hamstring Curls  10      Hip Flexion  10 Balance:  B   Hip Abduction 10 SLS 30 sec    Hip Circles 10 Tandem stance 30 sec    TA set 10 NBOS eyes open    Glut Set 10 NBOS eyes closed    Hip Extension  Hand to Opposite Knee 10   Hip Adduction    Box Step  3 R/L   Hip IR   Noodle Stance     Hip ER  Stop/Go Gait    Fig 8's  Switch Gait                Seated: B   Functional: B   Ankle Pumps 30 Step up forward 10 R only today   Ankle circles 10 Step up lateral 10 R, 10 L   Knee flex & ext 30 Step down 10   Hip Abd & Adduction 30 [place] squats    Bicycle  30 Crate Lifts 10   Add Set with ball 10Lunges forward    LX stab with med ball throws  Lunges lateral    Ankle INV  Lunges retro    Ankle EV  Lower ab curl with noodle      Upper ab curl with ball 10     Med ball straight lifts      Med ball diagonal lifts      Hydrorider          Noodle:      Leg Press Small x 10 R/L [address]: Instructed in for progression if tolerated later     hang at wall 1 min   relief Jog    Noodle hang deep water  Jumping Jacks    Noodle Bicycle  Heel to toe      Hand to opposite knee      Cross country skier      Rocking Horse        Timed Code Treatment Minutes:  60    Total Treatment Minutes:  60    Treatment/Activity Tolerance:  [x] Patient tolerated treatment well [] Patient limited by fatique  [] Patient limited by pain  [] Patient limited by other medical complications  [x] Other:discussed neck posture, sleeping positions.      Prognosis: [x] Good [] Fair  [] Poor    Patient Requires Follow-up: [x] Yes  [] No    Plan:   [x] Continue per plan of care [] Alter current plan (see comments)  [] Plan of care initiated [] Hold pending MD visit [] Discharge    Plan

## 2018-12-13 ENCOUNTER — HOSPITAL ENCOUNTER (OUTPATIENT)
Dept: PHYSICAL THERAPY | Age: 43
Setting detail: THERAPIES SERIES
Discharge: HOME | End: 2018-12-13
Payer: MEDICAID

## 2018-12-13 PROCEDURE — G8980 MOBILITY D/C STATUS: HCPCS

## 2018-12-13 PROCEDURE — G8979 MOBILITY GOAL STATUS: HCPCS

## 2018-12-13 PROCEDURE — 97530 THERAPEUTIC ACTIVITIES: CPT

## 2018-12-13 NOTE — FLOWSHEET NOTE
Tband lats    Post Shoulder Lumbar Rot  10 gentle   Pec Small ball pull downs                   diagonals 10     Finger/ wrist AROM  All x 10       Cervical:       AROM Flex       AROM Extension     LEs exercises:  B AROM Side Bending    Marching  10 AROM Rotation    Heel Raises  10 Chin tucks    Toe Raises 10 Chin nods     Squats  10      Hamstring Curls  10      Hip Flexion  10 Balance:  B   Hip Abduction 10 SLS 30 sec    Hip Circles 10 Tandem stance 30 sec    TA set 10 NBOS eyes open    Glut Set 10 NBOS eyes closed    Hip Extension  Hand to Opposite Knee 10   Hip Adduction    Box Step  3 R/L   Hip IR   Noodle Stance     Hip ER  Stop/Go Gait    Fig 8's  Switch Gait                Seated: B   Functional: B   Ankle Pumps 30 Step up forward 10 R only today   Ankle circles 10 Step up lateral 10 R, 10 L   Knee flex & ext 30 Step down 10   Hip Abd & Adduction 30 [place] squats    Bicycle  30 Crate Lifts 10   Add Set with ball 10Lunges forward    LX stab with med ball throws  Lunges lateral    Ankle INV  Lunges retro    Ankle EV  Lower ab curl with noodle      Upper ab curl with ball 10     Med ball straight lifts      Med ball diagonal lifts      Hydrorider          Noodle:      Leg Press Small x 10 R/L [place]: Instructed in for progression if tolerated later     hang at wall 1 min   relief Jog    Noodle hang deep water x5 mins Jumping Jacks    Noodle Bicycle  Heel to toe      Hand to opposite knee      Cross country skier      Rocking Horse        Timed Code Treatment Minutes:  70    Total Treatment Minutes:  70    Treatment/Activity Tolerance:  [x] Patient tolerated treatment well [] Patient limited by fatique  [] Patient limited by pain  [] Patient limited by other medical complications  [x] Other:Issued written aquatic HEP booklet,  [facility] information and 30 day pass. Reviewed aquatic exercise progression/precautions. Pt verbalized understanding.      Prognosis: [x] Good [] Fair  [] Poor    Patient

## 2018-12-13 NOTE — FLOWSHEET NOTE
discomfort        Objective:  Observation:   Test measurements:      Exercises:  Exercise/Equipment Resistance/Repetitions Other comments   Pt edu in self massage to healed incisions                                                                         Other Therapeutic Activities:  Pt edu in self massage to healed incisions    Home Exercise Program:  Pt demonstrated good understanding of written HEP.     Timed Code Treatment Minutes:  15    Total Treatment Minutes:  15    Treatment/Activity Tolerance:  [x] Patient tolerated treatment well [] Patient limited by fatigue  [] Patient limited by pain  [] Patient limited by other medical complications  [] Other:     Prognosis: [x] Good [] Fair  [] Poor    Patient Requires Follow-up: [x] Yes  [] No    Plan:   [] Continue per plan of care [] Alter current plan (see comments)  [] Plan of care initiated [] Hold pending MD visit [x] Discharge  Plan for Next Session:  D/c to hep and 30 day FC trial     Electronically signed by:  Charlie Slater [id number] Thony Morillo

## 2018-12-26 DIAGNOSIS — M79.7 FIBROMYALGIA: ICD-10-CM

## 2018-12-27 RX ORDER — METHOCARBAMOL 500 MG/1
TABLET, FILM COATED ORAL
Qty: 90 TABLET | Refills: 5 | Status: SHIPPED | OUTPATIENT
Start: 2018-12-27 | End: 2019-07-11 | Stop reason: SDUPTHER

## 2019-01-07 ENCOUNTER — TELEPHONE (OUTPATIENT)
Dept: INTERNAL MEDICINE CLINIC | Age: 44
End: 2019-01-07

## 2019-03-20 DIAGNOSIS — M79.7 FIBROMYALGIA: ICD-10-CM

## 2019-03-20 RX ORDER — DULOXETIN HYDROCHLORIDE 60 MG/1
CAPSULE, DELAYED RELEASE ORAL
Qty: 30 CAPSULE | Refills: 0 | Status: SHIPPED | OUTPATIENT
Start: 2019-03-20 | End: 2019-04-05

## 2019-03-20 RX ORDER — NAPROXEN 500 MG/1
TABLET ORAL
Qty: 60 TABLET | Refills: 3 | Status: SHIPPED | OUTPATIENT
Start: 2019-03-20 | End: 2019-04-05

## 2019-04-05 ENCOUNTER — OFFICE VISIT (OUTPATIENT)
Dept: RHEUMATOLOGY | Age: 44
End: 2019-04-05
Payer: MEDICAID

## 2019-04-05 ENCOUNTER — TELEPHONE (OUTPATIENT)
Dept: INTERNAL MEDICINE CLINIC | Age: 44
End: 2019-04-05

## 2019-04-05 ENCOUNTER — TELEPHONE (OUTPATIENT)
Dept: FAMILY MEDICINE CLINIC | Age: 44
End: 2019-04-05

## 2019-04-05 VITALS
HEART RATE: 92 BPM | WEIGHT: 192 LBS | BODY MASS INDEX: 30.13 KG/M2 | HEIGHT: 67 IN | SYSTOLIC BLOOD PRESSURE: 135 MMHG | DIASTOLIC BLOOD PRESSURE: 98 MMHG

## 2019-04-05 DIAGNOSIS — M79.7 FIBROMYALGIA: Primary | ICD-10-CM

## 2019-04-05 DIAGNOSIS — M79.7 FIBROMYALGIA: ICD-10-CM

## 2019-04-05 DIAGNOSIS — M25.50 POLYARTHRALGIA: ICD-10-CM

## 2019-04-05 LAB
A/G RATIO: 1.5 (ref 1.1–2.2)
ALBUMIN SERPL-MCNC: 4.1 G/DL (ref 3.4–5)
ALP BLD-CCNC: 53 U/L (ref 40–129)
ALT SERPL-CCNC: 13 U/L (ref 10–40)
ANION GAP SERPL CALCULATED.3IONS-SCNC: 11 MMOL/L (ref 3–16)
AST SERPL-CCNC: 20 U/L (ref 15–37)
BASOPHILS ABSOLUTE: 0 K/UL (ref 0–0.2)
BASOPHILS RELATIVE PERCENT: 0.8 %
BILIRUB SERPL-MCNC: <0.2 MG/DL (ref 0–1)
BUN BLDV-MCNC: 16 MG/DL (ref 7–20)
CALCIUM SERPL-MCNC: 9 MG/DL (ref 8.3–10.6)
CHLORIDE BLD-SCNC: 105 MMOL/L (ref 99–110)
CO2: 25 MMOL/L (ref 21–32)
CREAT SERPL-MCNC: 1 MG/DL (ref 0.6–1.1)
EOSINOPHILS ABSOLUTE: 0.3 K/UL (ref 0–0.6)
EOSINOPHILS RELATIVE PERCENT: 6.7 %
GFR AFRICAN AMERICAN: >60
GFR NON-AFRICAN AMERICAN: >60
GLOBULIN: 2.8 G/DL
GLUCOSE BLD-MCNC: 90 MG/DL (ref 70–99)
HCT VFR BLD CALC: 37.5 % (ref 36–48)
HEMOGLOBIN: 12.3 G/DL (ref 12–16)
LYMPHOCYTES ABSOLUTE: 1.5 K/UL (ref 1–5.1)
LYMPHOCYTES RELATIVE PERCENT: 30.1 %
MCH RBC QN AUTO: 30.8 PG (ref 26–34)
MCHC RBC AUTO-ENTMCNC: 32.8 G/DL (ref 31–36)
MCV RBC AUTO: 93.9 FL (ref 80–100)
MONOCYTES ABSOLUTE: 0.3 K/UL (ref 0–1.3)
MONOCYTES RELATIVE PERCENT: 6.2 %
NEUTROPHILS ABSOLUTE: 2.9 K/UL (ref 1.7–7.7)
NEUTROPHILS RELATIVE PERCENT: 56.2 %
PDW BLD-RTO: 14.1 % (ref 12.4–15.4)
PLATELET # BLD: 253 K/UL (ref 135–450)
PMV BLD AUTO: 9.4 FL (ref 5–10.5)
POTASSIUM SERPL-SCNC: 4.1 MMOL/L (ref 3.5–5.1)
RBC # BLD: 3.99 M/UL (ref 4–5.2)
SODIUM BLD-SCNC: 141 MMOL/L (ref 136–145)
TOTAL PROTEIN: 6.9 G/DL (ref 6.4–8.2)
WBC # BLD: 5.1 K/UL (ref 4–11)

## 2019-04-05 PROCEDURE — 99214 OFFICE O/P EST MOD 30 MIN: CPT | Performed by: INTERNAL MEDICINE

## 2019-04-05 PROCEDURE — 1036F TOBACCO NON-USER: CPT | Performed by: INTERNAL MEDICINE

## 2019-04-05 PROCEDURE — G8427 DOCREV CUR MEDS BY ELIG CLIN: HCPCS | Performed by: INTERNAL MEDICINE

## 2019-04-05 PROCEDURE — G8417 CALC BMI ABV UP PARAM F/U: HCPCS | Performed by: INTERNAL MEDICINE

## 2019-04-05 RX ORDER — PREGABALIN 150 MG/1
150 CAPSULE ORAL 3 TIMES DAILY
Qty: 90 CAPSULE | Refills: 2 | Status: SHIPPED | OUTPATIENT
Start: 2019-04-05 | End: 2019-08-27 | Stop reason: SDUPTHER

## 2019-04-05 RX ORDER — CELECOXIB 100 MG/1
100 CAPSULE ORAL 2 TIMES DAILY
Qty: 60 CAPSULE | Refills: 3 | Status: SHIPPED | OUTPATIENT
Start: 2019-04-05 | End: 2019-08-27 | Stop reason: SDUPTHER

## 2019-04-05 RX ORDER — DULOXETIN HYDROCHLORIDE 60 MG/1
CAPSULE, DELAYED RELEASE ORAL
Qty: 30 CAPSULE | Refills: 5 | Status: SHIPPED | OUTPATIENT
Start: 2019-04-05 | End: 2020-03-03 | Stop reason: SDUPTHER

## 2019-04-05 NOTE — TELEPHONE ENCOUNTER
[facility] DR TREMAYNE MATTHEWS call and states that Dr Ralph have to do PA on Lasix and Celebrex. Pt insurance did not cover the RX . She request call back if any question.

## 2019-04-05 NOTE — PROGRESS NOTES
2019  Patient Name: Bienvenido Hines  : 1975  Medical Record: [id number]    MEDICATIONS  Current Outpatient Medications   Medication Sig Dispense Refill    Multiple Vitamins-Minerals (HAIR SKIN & NAILS ADVANCED PO) Take by mouth      celecoxib (CELEBREX) 100 MG capsule Take 1 capsule by mouth 2 times daily 60 capsule 3    pregabalin (LYRICA) 150 MG capsule Take 1 capsule by mouth 3 times daily for 30 days. 90 capsule 2    DULoxetine (CYMBALTA) 60 MG extended release capsule Take 1 tab daily 30 capsule 5    Cholecalciferol (VITAMIN D3) 2000 units TABS TAKE 1 TABLET BY MOUTH ONCE DAILY 30 tablet 5    methocarbamol (ROBAXIN) 500 MG tablet TAKE 1 TABLET BY MOUTH THREE TIMES DAILY AS NEEDED FOR MUSCLE PAIN OR SPASMS 90 tablet 5    vitamin A 46348 units capsule Take 10,000 Units by mouth daily      omeprazole (PRILOSEC) 20 MG delayed release capsule Take 40 mg by mouth daily      Lactobacillus (PROBIOTIC ACIDOPHILUS PO) Take 1 capsule by mouth daily       magnesium oxide (MAG-OX) 400 MG tablet Take 400 mg by mouth daily      ferrous sulfate 325 (65 Fe) MG tablet Take 325 mg by mouth daily (with breakfast)      Alpha-Lipoic Acid 200 MG CAPS Take by mouth      polyethylene glycol (GLYCOLAX) powder MIX 17 GRAMS (1 CAPFUL) WITH 8 OZ OF FAVORITE LIQUID AND TAKE  BY MOUTH DAILY 1 Bottle 3    valACYclovir (VALTREX) 500 MG tablet Take 500 mg by mouth 3 times daily as needed       No current facility-administered medications for this visit. ALLERGIES  Allergies   Allergen Reactions    Penicillins Hives         Comments  No specialty comments available. Background history:  Bienvenido Hines is a 44 y.o. female who is being seen for follow up evaluation of musculoskeletal pain. She is complaining of chronic widespread musculoskeletal pain involving upper and lower body on both sides of the midline. Pain is worse around the ribs/neck. She has muscle spasms around the rib area.   She describes her pain as constant, 9 out of 10, aching, with no relieving or aggravating factors. Symptoms are progressively getting worse. Her symptoms started many years ago. She has tried meloxicam, Effexor, multiple NSAIDs with no improvement in symptoms. she also has been to ER multiple times for the same symptoms. Pain wakes her up frequently from sleep. Sleep is not refreshing. She has persistent fatigue, chronic headaches, brain fog, anxiety and depression. She also has pain in her joints associated with stiffness. She denies any swelling. She has difficulty opening doorknobs and jar cans. She also has pain on bottom of her feet which is worse after taking a step in the morning. She recently went to the ER and was prescribed Robaxin and naproxen which has helped alleviate some of the symptoms. She has never tried gabapentin, Elavil, Lyrica, Cymbalta or Savella. She has never tried any nonpharmacological therapy for her symptoms. Interim History: She presents for a follow-up of fibromyalgia. She continues to complain of chronic widespread musculoskeletal pain symptoms. She is on Lyrica 150 mg twice a day and Cymbalta 60 mg daily and  Robaxin. She is off of naproxen due to gastritis. She is on omeprazole. She feels that pain has worsened after stopping naproxen. She has also done some aquatic therapy which has helped. She underwent resection for multiple lipomas. HPI  ROS    REVIEW OF SYSTEMS: positive for fatigue, chest pain, shortness of breath, swollen legs,  muscle spasm, anxiety, difficulty staying sleep  Constitutional: No unanticipated weight loss or fevers.    Integumentary: No rash, photosensitivity, malar rash, livedo reticularis, alopecia and Raynaud's symptoms, sclerodactyly, skin tightening  Eyes: negative for dry eyes, visual disturbance and persistent redness, discharge from eyes   ENT: - No tinnitus, loss of hearing, vertigo, or recurrent ear infections.  - No history of nasal/oral ulcers. - No history of sicca symptoms. Cardiovascular: No history of pericarditis, or murmur or palpitations  Respiratory: No cough or history of interstitial lung disease. No history of pleurisy. No history of tuberculosis or atypical infections. Gastrointestinal: No history of heart burn, dysphagia or esophageal dysmotility. No change in bowel habits or any inflammatory bowel disease. Genitourinary: No history renal disease, miscarriages. Hematologic/Lymphatic: No abnormal bruising or bleeding, blood clots or swollen lymph nodes. Neurological: No history seizure or focal weakness. No history of neuropathies, paresthesias or hyperesthesias, facial droop, diplopia  Psychiatric: No history of bipolar disease  Endocrine: Denies any thyroid / parathyroid disease and osteoporosis  Allergic/Immunologic: No nasal congestion or hives. I have reviewed patients Past medical History, Social History and Family History as mentioned in her chart and this remains unchanged from previous.     Past Medical History:   Diagnosis Date    Anxiety     Chest wall pain     Constipation     Costochondritis     Depression     Fibroid, uterine     Fibromyalgia     Pleurisy     [facility]     Past Surgical History:   Procedure Laterality Date    BREAST SURGERY      bx    COLONOSCOPY N/A 11/15/2018    COLONOSCOPY DIAGNOSTIC performed by Faye Mosqueda MD at [address] ([address])  07/24/2018    epigastric hernia, lipomas of right thigh, left thigh, left chest wall x 2, left posterior arm, right posterior arm, right lower back    MOUTH SURGERY N/A March 1, 2016    wisdom teeth    MYOMECTOMY      OTHER SURGICAL HISTORY  08/16/1994    vaginal delivery     Social History     Socioeconomic History    Marital status:      Spouse name: Not on file    Number of children: Not on file    Years of education: Not on file    Highest education level: Not on file Occupational History    Not on file   Social Needs    Financial resource strain: Not on file    Food insecurity:     Worry: Not on file     Inability: Not on file    Transportation needs:     Medical: Not on file     Non-medical: Not on file   Tobacco Use    Smoking status: Former Smoker     Packs/day: 0.25     Years: 15.00     Pack years: 3.75     Last attempt to quit: 2012     Years since quittin.0    Smokeless tobacco: Never Used   Substance and Sexual Activity    Alcohol use: Yes     Comment: social    Drug use: No    Sexual activity: Yes     Partners: Male   Lifestyle    Physical activity:     Days per week: Not on file     Minutes per session: Not on file    Stress: Not on file   Relationships    Social connections:     Talks on phone: Not on file     Gets together: Not on file     Attends Mosque service: Not on file     Active member of club or organization: Not on file     Attends meetings of clubs or organizations: Not on file     Relationship status: Not on file    Intimate partner violence:     Fear of current or ex partner: Not on file     Emotionally abused: Not on file     Physically abused: Not on file     Forced sexual activity: Not on file   Other Topics Concern    Not on file   Social History Narrative    Not on file     Family History   Problem Relation Age of Onset    High Blood Pressure Mother     Other Mother         fibromyalgia         PHYSICAL EXAM   Vitals:    19 0834   BP: (!) 135/98   Site: Right Lower Arm   Pulse: 92   Weight: 192 lb (87.1 kg)   Height: 5' 6.5\" (1.689 m)     Physical Exam  Constitutional:  Well developed, well nourished, no acute distress, non-toxic appearance   Musculoskeletal:                                            Right            Left                                   Tender Tender    Costochondral  + +   Low cervical + +   suboccipital + +   Trapezius  + +   Supraspinatus  + +   Lateral epicondyl + +   Gluteal + +   Greater 7.6 06/28/2018    EOSPCT 3.1 11/20/2010    BASOPCT 0.8 06/28/2018    MONOSABS 0.5 06/28/2018    LYMPHSABS 2.3 06/28/2018    EOSABS 0.4 06/28/2018    BASOSABS 0.1 06/28/2018    DIFFTYPE Manual 01/03/2013       Chemistry        Component Value Date/Time     06/28/2018 2145    K 3.8 06/28/2018 2145     06/28/2018 2145    CO2 25 06/28/2018 2145    BUN 15 06/28/2018 2145    CREATININE 0.8 06/28/2018 2145        Component Value Date/Time    CALCIUM 8.8 06/28/2018 2145    ALKPHOS 41 06/28/2018 2145    AST 15 06/28/2018 2145    ALT 11 06/28/2018 2145    BILITOT 0.5 06/28/2018 2145          Lab Results   Component Value Date    SEDRATE 15 08/22/2017     Lab Results   Component Value Date    CRP 2.1 08/22/2017     Lab Results   Component Value Date    ANNEMARIE Negative 05/19/2015     Lab Results   Component Value Date    RF <10.0 08/22/2017    CCPABIGG 6 08/22/2017     Lab Results   Component Value Date    ANNEMARIE Negative 05/19/2015    ANAINT see below 05/19/2015       ASSESSMENT AND PLAN      Assessment/Plan:      ASSESSMENT:    1. Fibromyalgia    2. Polyarthralgia        PLAN:   1. Fibromyalgia  Continues to be symptomatic  -I will increase Lyrica to 150 mg 3 times a day  -Continue Cymbalta 60 mg daily  -Start Celebrex 100 mg twice a day, cannot take naproxen due to gastritis.  -Continue Robaxin  -Strongly emphasized on low impact aerobic and stretching exercises including biking, swimming, walking, yoga or tiachi classes   -Counseled on Sleep hygiene   -Advised to drink 64 oz of water   -Limit caffeine intake to 8 oz/day     - CBC Auto Differential; Future  - Comprehensive Metabolic Panel; Future  - celecoxib (CELEBREX) 100 MG capsule; Take 1 capsule by mouth 2 times daily  Dispense: 60 capsule; Refill: 3  - pregabalin (LYRICA) 150 MG capsule; Take 1 capsule by mouth 3 times daily for 30 days. Dispense: 90 capsule; Refill: 2  - DULoxetine (CYMBALTA) 60 MG extended release capsule;  Take 1 tab daily  Dispense: 30 capsule; Refill: 5    2. Polyarthralgia  most likely secondary to fibromyalgia/chronic pain. Blood work negative for rheumatoid arthritis.    -We'll start Celebrex  - celecoxib (CELEBREX) 100 MG capsule; Take 1 capsule by mouth 2 times daily  Dispense: 60 capsule; Refill: 3      Time spent with the patient 25 minutes and half of the time spent with counseling/coordination     The patient indicates understanding of these issues and agrees with the plan. Return in about 6 months (around 10/5/2019). The risks and benefits of my recommendations, as well as other treatment options, benefits and side effects were discussed with the patient. All questions were answered. ######################################################################    I thank you for giving me the opportunity to participate in [facility]. If you have any questions or concerns please feel free to contact me. I look forward to following  [place] along with you. Electronically signed by: Tristan Pickard MD, 4/5/2019 9:21 AM    Documentation was done using voice recognition dragon software. Every effort was made to ensure accuracy; however, inadvertent unintentional computerized transcription errors may be present.

## 2019-04-05 NOTE — TELEPHONE ENCOUNTER
She is currently on Lyrica, increase dose to 150 mg 3 times a day. She is unable to tolerate naproxen even after taking omeprazole.

## 2019-04-05 NOTE — LETTER
[facility]  [address] [address]  Phone: [phone]  Fax: [phone]    Urbano Oliveros MD        April 5, 2019     Patient: Ann Meadows   YOB: 1975   Date of Visit: 4/5/2019       To Whom It May Concern: It is my medical opinion that Sarah Hernandez requires a disability parking placard for the following reasons:  She has limited walking ability due to an arthritic condition. Duration of need: 6 months    If you have any questions or concerns, please don't hesitate to call.     Sincerely,        Urbano Oliveros MD

## 2019-04-09 NOTE — TELEPHONE ENCOUNTER
Received APPROVAL for Celecoxib 100MG OR CAPS through 04/01/2020. Approval letter attached. Please advise patient. Thank you.

## 2019-04-11 ENCOUNTER — TELEPHONE (OUTPATIENT)
Dept: INTERNAL MEDICINE CLINIC | Age: 44
End: 2019-04-11

## 2019-04-11 DIAGNOSIS — R92.8 ABNORMAL MAMMOGRAM OF RIGHT BREAST: Primary | ICD-10-CM

## 2019-04-11 NOTE — TELEPHONE ENCOUNTER
Jessica with scheduling called.   Patient does not need a diagnostic mammogram she just needs an US of right breast.

## 2019-04-19 ENCOUNTER — HOSPITAL ENCOUNTER (OUTPATIENT)
Dept: ULTRASOUND IMAGING | Age: 44
Discharge: HOME | End: 2019-04-19
Payer: MEDICAID

## 2019-04-19 DIAGNOSIS — R92.8 ABNORMAL MAMMOGRAM OF RIGHT BREAST: ICD-10-CM

## 2019-04-19 PROCEDURE — 76642 ULTRASOUND BREAST LIMITED: CPT

## 2019-05-08 ENCOUNTER — TELEPHONE (OUTPATIENT)
Dept: RHEUMATOLOGY | Age: 44
End: 2019-05-08

## 2019-05-08 NOTE — TELEPHONE ENCOUNTER
PA submitted for LYRICA 150 MG CAPS on CMM  Key: [id number] - PA Case ID: [id number] - Rx #: [id number]     Pending review

## 2019-07-11 DIAGNOSIS — M79.7 FIBROMYALGIA: ICD-10-CM

## 2019-07-11 RX ORDER — METHOCARBAMOL 500 MG/1
TABLET, FILM COATED ORAL
Qty: 90 TABLET | Refills: 5 | Status: SHIPPED | OUTPATIENT
Start: 2019-07-11 | End: 2019-12-30

## 2019-07-29 ENCOUNTER — TELEPHONE (OUTPATIENT)
Dept: INTERNAL MEDICINE CLINIC | Age: 44
End: 2019-07-29

## 2019-07-29 DIAGNOSIS — M79.7 FIBROMYALGIA: Primary | ICD-10-CM

## 2019-07-29 NOTE — TELEPHONE ENCOUNTER
Ok for referral to rheum    Also do referral for PT 2 x wk x 3 wks. Dx fibromyalgia.   eval and  treat

## 2019-08-08 ENCOUNTER — HOSPITAL ENCOUNTER (OUTPATIENT)
Dept: PHYSICAL THERAPY | Age: 44
Setting detail: THERAPIES SERIES
Discharge: HOME | End: 2019-08-08
Payer: MEDICAID

## 2019-08-08 PROCEDURE — 97162 PT EVAL MOD COMPLEX 30 MIN: CPT

## 2019-08-08 PROCEDURE — 97140 MANUAL THERAPY 1/> REGIONS: CPT

## 2019-08-08 PROCEDURE — 97530 THERAPEUTIC ACTIVITIES: CPT

## 2019-08-08 ASSESSMENT — PAIN DESCRIPTION - DESCRIPTORS: DESCRIPTORS: ACHING;CRAMPING;DISCOMFORT;SHOOTING

## 2019-08-08 ASSESSMENT — PAIN DESCRIPTION - ONSET: ONSET: AWAKENED FROM SLEEP

## 2019-08-08 ASSESSMENT — PAIN DESCRIPTION - FREQUENCY: FREQUENCY: CONTINUOUS

## 2019-08-08 ASSESSMENT — PAIN DESCRIPTION - PAIN TYPE: TYPE: CHRONIC PAIN

## 2019-08-08 ASSESSMENT — PAIN - FUNCTIONAL ASSESSMENT: PAIN_FUNCTIONAL_ASSESSMENT: PREVENTS OR INTERFERES WITH MANY ACTIVE NOT PASSIVE ACTIVITIES

## 2019-08-08 ASSESSMENT — PAIN DESCRIPTION - ORIENTATION: ORIENTATION: LEFT;RIGHT

## 2019-08-08 ASSESSMENT — PAIN DESCRIPTION - PROGRESSION: CLINICAL_PROGRESSION: NOT CHANGED

## 2019-08-08 ASSESSMENT — PAIN SCALES - GENERAL: PAINLEVEL_OUTOF10: 8

## 2019-08-08 NOTE — FLOWSHEET NOTE
Physical Therapy Daily Treatment Note  Date:  2019    Patient Name:  Shannon Oneal    :  1975  MRN: [id number]    Restrictions/Precautions: Restrictions/Precautions  Restrictions/Precautions: Fall Risk(none)    Pertinent Medical History:      Medical/Treatment Diagnosis Information:  · Diagnosis: fibromyalgia  · Treatment Diagnosis: increased pain    Insurance/Certification information:  PT Insurance Information: Ej  Physician Information:  Referring Practitioner: Dr. Sona Wilder of care signed (Y/N):      Visit# / total visits:    Pain level:  8/10     Functional Assessment: at eval  Test:UEFS  Score:43.75    Progress Note: []  Yes  [x]  No  Next due by: Visit #10      History of Injury: Patient has had diagnosis of fibromyalgia for several years she had water therapy at the same time last year but did not continue secondary to personal issues. She is helping maintain her symptoms with holistis techniques that have helped calm down the legs but the core is worse since abdominal herniasurgery ilast year. She gets a massage 1 x a week    Subjective:  increased pain with reaching and sqiueezing, hurts abdominal to reach forward    Objective:  Observation:   Test measurements:  See eval      Exercises:  Exercise/Equipment Resistance/Repetitions Other comments        PRRT  obicularis oculi, frontalis, trigeminal 5 min         Cranial sacral balancing 10 min         Pain drain  add   Diapraghm rlease  add                                         Other Therapeutic Activities:  Patient was educated on diagnosis, plan of care and prognosis of their complaint. Also, frequency and duration of treatments was discussed. Patient was informed of the attendance policy and issued a copy for their records. Home Exercise Program: Patient given home exercise program and demonstrates correct technique. HEP booklet also issued.     Timed Code Treatment Minutes:  30    Total Treatment Minutes:

## 2019-08-13 ENCOUNTER — HOSPITAL ENCOUNTER (OUTPATIENT)
Dept: PHYSICAL THERAPY | Age: 44
Setting detail: THERAPIES SERIES
Discharge: HOME | End: 2019-08-13
Payer: MEDICAID

## 2019-08-15 ENCOUNTER — APPOINTMENT (OUTPATIENT)
Dept: PHYSICAL THERAPY | Age: 44
End: 2019-08-15
Payer: MEDICAID

## 2019-08-20 ENCOUNTER — APPOINTMENT (OUTPATIENT)
Dept: PHYSICAL THERAPY | Age: 44
End: 2019-08-20
Payer: MEDICAID

## 2019-08-22 ENCOUNTER — APPOINTMENT (OUTPATIENT)
Dept: PHYSICAL THERAPY | Age: 44
End: 2019-08-22
Payer: MEDICAID

## 2019-08-22 ENCOUNTER — HOSPITAL ENCOUNTER (OUTPATIENT)
Dept: PHYSICAL THERAPY | Age: 44
Setting detail: THERAPIES SERIES
Discharge: HOME | End: 2019-08-22
Payer: MEDICAID

## 2019-08-22 PROCEDURE — 97113 AQUATIC THERAPY/EXERCISES: CPT

## 2019-08-27 ENCOUNTER — HOSPITAL ENCOUNTER (OUTPATIENT)
Dept: PHYSICAL THERAPY | Age: 44
Setting detail: THERAPIES SERIES
Discharge: HOME | End: 2019-08-27
Payer: MEDICAID

## 2019-08-27 ENCOUNTER — APPOINTMENT (OUTPATIENT)
Dept: PHYSICAL THERAPY | Age: 44
End: 2019-08-27
Payer: MEDICAID

## 2019-08-27 PROCEDURE — 97150 GROUP THERAPEUTIC PROCEDURES: CPT

## 2019-08-27 PROCEDURE — 97113 AQUATIC THERAPY/EXERCISES: CPT

## 2019-08-27 NOTE — FLOWSHEET NOTE
Pec   Small ball pull downs                   diagonals             Cervical:       AROM Flex       AROM Extension     LEs exercises:  B AROM Side Bending    Marching  10 AROM Rotation    Heel Raises  10 [place] tucks    Toe Raises  10 Chin nods     Squats  10      Hamstring Curls  10      Hip Flexion  10 Balance: Hip Abduction 10 SLS    Hip Circles 10 Tandem stance    TA set 10 NBOS eyes open    Glut Set 10 NBOS eyes closed    Hip Extension  Hand to Opposite Knee 7   Hip Adduction    Box Step     Hip IR   Noodle Stance     Hip ER  Stop/Go Gait    Fig 8's  Switch Gait                Seated:  Functional:    Ankle Pumps  Step up forward    Ankle circles  Step up lateral    Knee flex & ext  Step down    Hip Abd & Adduction  [place] squats    Bicycle   Crate Lifts    Add Set with ball  Lunges forward    LX stab with med ball throws  Lunges lateral    Ankle INV  Lunges retro    Ankle EV  Lower ab curl with noodle      Upper ab curl with ball      Med ball straight lifts      Med ball diagonal lifts      Hydrorider          Noodle:      Leg Press  Deep Water:    Noodle hang at wall  Jog    Noodle hang/bike deep water 5 min    ind        relief Jumping Jacks      Heel to toe      Hand to opposite knee      Cross country skier      Rocking Horse        Individual Aquatic Timed Minutes:  15    Group Aquatic Timed Minutes:  20  Aquatic group therapy is the presence of more than 1 patient in the pool, at the same time. During that time each patient receives individualized instruction designed for their specific diagnosis. Treatment/Activity Tolerance:  [x] Patient tolerated treatment well [] Patient limited by fatique  [] Patient limited by pain  [] Patient limited by other medical complications  [x] Other: decreased pain 3/10 after aquatic ex.      Prognosis: [x] Good [] Fair  [] Poor    Patient Requires Follow-up: [x] Yes  [] No    Plan:   [x] Continue per plan of care [] Alter current plan (see comments)  []

## 2019-08-29 ENCOUNTER — HOSPITAL ENCOUNTER (OUTPATIENT)
Dept: PHYSICAL THERAPY | Age: 44
Setting detail: THERAPIES SERIES
Discharge: HOME | End: 2019-08-29
Payer: MEDICAID

## 2019-08-29 ENCOUNTER — APPOINTMENT (OUTPATIENT)
Dept: PHYSICAL THERAPY | Age: 44
End: 2019-08-29
Payer: MEDICAID

## 2019-08-29 PROCEDURE — 97140 MANUAL THERAPY 1/> REGIONS: CPT

## 2019-08-29 PROCEDURE — 97113 AQUATIC THERAPY/EXERCISES: CPT

## 2019-08-29 PROCEDURE — 97150 GROUP THERAPEUTIC PROCEDURES: CPT

## 2019-08-29 PROCEDURE — G0283 ELEC STIM OTHER THAN WOUND: HCPCS

## 2019-08-29 NOTE — FLOWSHEET NOTE
Physical Therapy Aquatic Flow Sheet   Date:  2019    Patient Name:  Fransisco Bello    :  1975     Restrictions/Precautions: Restrictions/Precautions  Restrictions/Precautions: Fall Risk(none)    Pertinent Medical History:       Medical/Treatment Diagnosis Information:  · Diagnosis: fibromyalgia  · Treatment Diagnosis: increased pain     Insurance/Certification information:  PT Insurance Information: Ej  Physician Information:  Referring Practitioner: Dr. Maria [facility] signed (Y/N):       Visit# / total visits:     Pain level:       4/10      Progress Note: []  Yes  [x]  No  Next due by: Visit #10:      History of Injury:Patient has had diagnosis of fibromyalgia for several years she had water therapy at the same time last year but did not continue secondary to personal issues. Rodolfo Cancer is helping maintain her symptoms with holistis techniques that have helped calm down the legs but the core is worse since abdominal herniasurgery ilast year. Rodolfo Cancer gets a massage 1 x a week    Subjective:  Land Rx helpful. devin last visit well.      Objective:   Observation:  See eval   Test measurements:      Key  B= Belt DB= Dumbells T= Theratube   G=Gloves N= Noodles W= Weights   P= Paddles WW=Water Walker K= Kickboard        Transfers:   steps ind      % Immersion: 75%            Ambulation:  laps ind Exercises UE:  B    Forwards 7 Shoulder Shrugs 10    Lateral 2 Shoulder circles 10    Marching 1 Scapular Retraction 10    Retro   Rolling 10    [place]  Push Downs 10     IR/ER 10     Punching    Stretching: B  30 sec  Rowing 10   Gastroc/Soleus  2 Elbow Flex/Ext 10   Hamstring  2 Shldr Flex/Ext 10   Knee flex stretch   Shldr Abd/Add 10   Piriformis  1 Horiz Abd/Add 10   Hip flexor    Arm Circles 10   SKTC   PNF Diagonals    DKTC 1 min UE oscillations f/b, s/s    ITB   Wall Push-ups    Quad  Figure 8's    Mid back   Buoy pull/push downs    UT  Tband rows    Rhom  Tband lats    Post Shoulder

## 2019-08-29 NOTE — FLOWSHEET NOTE
home exercise program and demonstrates correct technique. HEP booklet also issued. Timed Code Treatment Minutes: 15    Total Treatment Minutes:  35    Assessment:  [] Patient tolerated treatment well [] Patient limited by fatigue  [x] Patient limited by pain  [] Patient limited by other medical complications  [x] Other: patient responds well to holistic techniques        Prognosis: [x] Good [] Fair  [] Poor    Goals:    Short term goals  Time Frame for Short term goals: 2 weeks  Short term goal 1: Decrease pain to 6/10 at worst  Short term goal 2: Patient tolerates land and water therapy without increased pain  Short term goal 3: Patient able to open doors more easily      Long term goals  Time Frame for Long term goals : discharge  Long term goal 1: Decrease pain to 2-3/10 at worst  Long term goal 2: Patient independent in management of pain symptoms  Long term goal 3: patient able to cook without increased pain.        Patient Requires Follow-up: [x] Yes  [] No    Plan:   [x] Continue per plan of care [] Alter current plan (see comments)  [] Plan of care initiated [] Hold pending MD visit [] Discharge    Plan for Next Session: assess modalities    Electronically signed by:  KIN Arroyo,[id number]

## 2019-09-03 ENCOUNTER — HOSPITAL ENCOUNTER (OUTPATIENT)
Dept: PHYSICAL THERAPY | Age: 44
Setting detail: THERAPIES SERIES
Discharge: HOME | End: 2019-09-03
Payer: MEDICAID

## 2019-09-03 ENCOUNTER — APPOINTMENT (OUTPATIENT)
Dept: PHYSICAL THERAPY | Age: 44
End: 2019-09-03
Payer: MEDICAID

## 2019-09-03 PROCEDURE — 97150 GROUP THERAPEUTIC PROCEDURES: CPT

## 2019-09-03 PROCEDURE — 97140 MANUAL THERAPY 1/> REGIONS: CPT

## 2019-09-03 PROCEDURE — 97113 AQUATIC THERAPY/EXERCISES: CPT

## 2019-09-03 PROCEDURE — G0283 ELEC STIM OTHER THAN WOUND: HCPCS

## 2019-09-04 ENCOUNTER — TELEPHONE (OUTPATIENT)
Dept: RHEUMATOLOGY | Age: 44
End: 2019-09-04

## 2019-09-05 ENCOUNTER — APPOINTMENT (OUTPATIENT)
Dept: PHYSICAL THERAPY | Age: 44
End: 2019-09-05
Payer: MEDICAID

## 2019-09-05 ENCOUNTER — HOSPITAL ENCOUNTER (OUTPATIENT)
Dept: PHYSICAL THERAPY | Age: 44
Setting detail: THERAPIES SERIES
Discharge: HOME | End: 2019-09-05
Payer: MEDICAID

## 2019-09-05 DIAGNOSIS — M79.7 FIBROMYALGIA: ICD-10-CM

## 2019-09-05 PROCEDURE — 97113 AQUATIC THERAPY/EXERCISES: CPT

## 2019-09-05 PROCEDURE — 97150 GROUP THERAPEUTIC PROCEDURES: CPT

## 2019-09-05 PROCEDURE — 97140 MANUAL THERAPY 1/> REGIONS: CPT

## 2019-09-05 PROCEDURE — G0283 ELEC STIM OTHER THAN WOUND: HCPCS

## 2019-09-05 RX ORDER — PREGABALIN 225 MG/1
225 CAPSULE ORAL 2 TIMES DAILY
Qty: 60 CAPSULE | Refills: 2 | Status: SHIPPED | OUTPATIENT
Start: 2019-09-05 | End: 2019-11-14 | Stop reason: CLARIF

## 2019-09-10 ENCOUNTER — APPOINTMENT (OUTPATIENT)
Dept: PHYSICAL THERAPY | Age: 44
End: 2019-09-10
Payer: MEDICAID

## 2019-09-10 ENCOUNTER — HOSPITAL ENCOUNTER (OUTPATIENT)
Dept: PHYSICAL THERAPY | Age: 44
Setting detail: THERAPIES SERIES
Discharge: HOME | End: 2019-09-10
Payer: MEDICAID

## 2019-09-10 PROCEDURE — 97140 MANUAL THERAPY 1/> REGIONS: CPT

## 2019-09-10 PROCEDURE — G0283 ELEC STIM OTHER THAN WOUND: HCPCS

## 2019-09-10 NOTE — FLOWSHEET NOTE
technique. HEP booklet also issued. Timed Code Treatment Minutes: 20    Total Treatment Minutes:  40     Assessment:  [] Patient tolerated treatment well [] Patient limited by fatigue  [x] Patient limited by pain  [] Patient limited by other medical complications  [x] Other:  Reviewed use of TENS unit. Decreased pain after Rx        Prognosis: [x] Good [] Fair  [] Poor    Goals:    Short term goals  Time Frame for Short term goals: 2 weeks  Short term goal 1: Decrease pain to 6/10 at worst  Short term goal 2: Patient tolerates land and water therapy without increased pain  Short term goal 3: Patient able to open doors more easily      Long term goals  Time Frame for Long term goals : discharge  Long term goal 1: Decrease pain to 2-3/10 at worst  Long term goal 2: Patient independent in management of pain symptoms  Long term goal 3: patient able to cook without increased pain.        Patient Requires Follow-up: [x] Yes  [] No    Plan:   [x] Continue per plan of care [] Alter current plan (see comments)  [] Plan of care initiated [] Hold pending MD visit [] Discharge    Plan for Next Session: cont modalities    Electronically signed by:  Sebastian Kumar

## 2019-09-11 ENCOUNTER — APPOINTMENT (OUTPATIENT)
Dept: CT IMAGING | Age: 44
End: 2019-09-11
Payer: MEDICAID

## 2019-09-11 ENCOUNTER — HOSPITAL ENCOUNTER (EMERGENCY)
Age: 44
Discharge: HOME | End: 2019-09-11
Attending: EMERGENCY MEDICINE
Payer: MEDICAID

## 2019-09-11 VITALS
SYSTOLIC BLOOD PRESSURE: 149 MMHG | BODY MASS INDEX: 29.89 KG/M2 | RESPIRATION RATE: 13 BRPM | HEIGHT: 66 IN | HEART RATE: 72 BPM | OXYGEN SATURATION: 98 % | WEIGHT: 186 LBS | TEMPERATURE: 98.4 F | DIASTOLIC BLOOD PRESSURE: 85 MMHG

## 2019-09-11 DIAGNOSIS — N83.201 BILATERAL OVARIAN CYSTS: ICD-10-CM

## 2019-09-11 DIAGNOSIS — R91.8 LUNG NODULES: ICD-10-CM

## 2019-09-11 DIAGNOSIS — N83.202 BILATERAL OVARIAN CYSTS: ICD-10-CM

## 2019-09-11 DIAGNOSIS — R10.32 LEFT LOWER QUADRANT PAIN: Primary | ICD-10-CM

## 2019-09-11 LAB
A/G RATIO: 1.4 (ref 1.1–2.2)
ALBUMIN SERPL-MCNC: 4.3 G/DL (ref 3.4–5)
ALP BLD-CCNC: 54 U/L (ref 40–129)
ALT SERPL-CCNC: 14 U/L (ref 10–40)
ANION GAP SERPL CALCULATED.3IONS-SCNC: 10 MMOL/L (ref 3–16)
AST SERPL-CCNC: 19 U/L (ref 15–37)
BACTERIA: ABNORMAL /HPF
BASOPHILS ABSOLUTE: 0 K/UL (ref 0–0.2)
BASOPHILS RELATIVE PERCENT: 0.7 %
BILIRUB SERPL-MCNC: 0.4 MG/DL (ref 0–1)
BILIRUBIN URINE: NEGATIVE
BLOOD, URINE: ABNORMAL
BUN BLDV-MCNC: 16 MG/DL (ref 7–20)
CALCIUM SERPL-MCNC: 9.2 MG/DL (ref 8.3–10.6)
CHLORIDE BLD-SCNC: 102 MMOL/L (ref 99–110)
CLARITY: ABNORMAL
CO2: 24 MMOL/L (ref 21–32)
COLOR: YELLOW
CREAT SERPL-MCNC: 0.9 MG/DL (ref 0.6–1.1)
EOSINOPHILS ABSOLUTE: 0.3 K/UL (ref 0–0.6)
EOSINOPHILS RELATIVE PERCENT: 5.1 %
EPITHELIAL CELLS, UA: ABNORMAL /HPF
GFR AFRICAN AMERICAN: >60
GFR NON-AFRICAN AMERICAN: >60
GLOBULIN: 3.1 G/DL
GLUCOSE BLD-MCNC: 91 MG/DL (ref 70–99)
GLUCOSE URINE: NEGATIVE MG/DL
HCT VFR BLD CALC: 36.4 % (ref 36–48)
HEMOGLOBIN: 12.1 G/DL (ref 12–16)
KETONES, URINE: NEGATIVE MG/DL
LEUKOCYTE ESTERASE, URINE: NEGATIVE
LIPASE: 17 U/L (ref 13–60)
LYMPHOCYTES ABSOLUTE: 1.5 K/UL (ref 1–5.1)
LYMPHOCYTES RELATIVE PERCENT: 29.4 %
MCH RBC QN AUTO: 30.8 PG (ref 26–34)
MCHC RBC AUTO-ENTMCNC: 33.3 G/DL (ref 31–36)
MCV RBC AUTO: 92.7 FL (ref 80–100)
MICROSCOPIC EXAMINATION: YES
MONOCYTES ABSOLUTE: 0.3 K/UL (ref 0–1.3)
MONOCYTES RELATIVE PERCENT: 6.5 %
NEUTROPHILS ABSOLUTE: 2.9 K/UL (ref 1.7–7.7)
NEUTROPHILS RELATIVE PERCENT: 58.3 %
NITRITE, URINE: NEGATIVE
PDW BLD-RTO: 14.2 % (ref 12.4–15.4)
PH UA: 7 (ref 5–8)
PLATELET # BLD: 246 K/UL (ref 135–450)
PMV BLD AUTO: 8.7 FL (ref 5–10.5)
POTASSIUM REFLEX MAGNESIUM: 4.1 MMOL/L (ref 3.5–5.1)
PROTEIN UA: NEGATIVE MG/DL
RBC # BLD: 3.92 M/UL (ref 4–5.2)
RBC UA: ABNORMAL /HPF (ref 0–2)
SODIUM BLD-SCNC: 136 MMOL/L (ref 136–145)
SPECIFIC GRAVITY UA: 1.02 (ref 1–1.03)
TOTAL PROTEIN: 7.4 G/DL (ref 6.4–8.2)
URINE REFLEX TO CULTURE: ABNORMAL
URINE TYPE: ABNORMAL
UROBILINOGEN, URINE: 4 E.U./DL
WBC # BLD: 5.1 K/UL (ref 4–11)
WBC UA: ABNORMAL /HPF (ref 0–5)

## 2019-09-11 PROCEDURE — 85025 COMPLETE CBC W/AUTO DIFF WBC: CPT

## 2019-09-11 PROCEDURE — 6360000004 HC RX CONTRAST MEDICATION: Performed by: EMERGENCY MEDICINE

## 2019-09-11 PROCEDURE — 80053 COMPREHEN METABOLIC PANEL: CPT

## 2019-09-11 PROCEDURE — 36415 COLL VENOUS BLD VENIPUNCTURE: CPT

## 2019-09-11 PROCEDURE — 81001 URINALYSIS AUTO W/SCOPE: CPT

## 2019-09-11 PROCEDURE — 83690 ASSAY OF LIPASE: CPT

## 2019-09-11 PROCEDURE — 96375 TX/PRO/DX INJ NEW DRUG ADDON: CPT

## 2019-09-11 PROCEDURE — 6360000002 HC RX W HCPCS: Performed by: EMERGENCY MEDICINE

## 2019-09-11 PROCEDURE — 99284 EMERGENCY DEPT VISIT MOD MDM: CPT

## 2019-09-11 PROCEDURE — 74177 CT ABD & PELVIS W/CONTRAST: CPT

## 2019-09-11 PROCEDURE — 2580000003 HC RX 258: Performed by: EMERGENCY MEDICINE

## 2019-09-11 PROCEDURE — 96374 THER/PROPH/DIAG INJ IV PUSH: CPT

## 2019-09-11 RX ORDER — NAPROXEN 500 MG/1
500 TABLET ORAL 2 TIMES DAILY PRN
Qty: 20 TABLET | Refills: 0 | Status: SHIPPED | OUTPATIENT
Start: 2019-09-11 | End: 2019-09-11

## 2019-09-11 RX ORDER — ONDANSETRON 2 MG/ML
4 INJECTION INTRAMUSCULAR; INTRAVENOUS ONCE
Status: COMPLETED | OUTPATIENT
Start: 2019-09-11 | End: 2019-09-11

## 2019-09-11 RX ORDER — MORPHINE SULFATE 4 MG/ML
4 INJECTION, SOLUTION INTRAMUSCULAR; INTRAVENOUS ONCE
Status: COMPLETED | OUTPATIENT
Start: 2019-09-11 | End: 2019-09-11

## 2019-09-11 RX ORDER — ACETAMINOPHEN 500 MG
1000 TABLET ORAL EVERY 8 HOURS PRN
Qty: 540 TABLET | Refills: 1 | Status: SHIPPED | OUTPATIENT
Start: 2019-09-11

## 2019-09-11 RX ORDER — 0.9 % SODIUM CHLORIDE 0.9 %
1000 INTRAVENOUS SOLUTION INTRAVENOUS ONCE
Status: COMPLETED | OUTPATIENT
Start: 2019-09-11 | End: 2019-09-11

## 2019-09-11 RX ORDER — ONDANSETRON 4 MG/1
4 TABLET, FILM COATED ORAL EVERY 8 HOURS PRN
Qty: 10 TABLET | Refills: 0 | Status: SHIPPED | OUTPATIENT
Start: 2019-09-11 | End: 2020-02-10

## 2019-09-11 RX ADMIN — IOVERSOL 100 ML: 678 INJECTION INTRA-ARTERIAL; INTRAVENOUS at 11:31

## 2019-09-11 RX ADMIN — MORPHINE SULFATE 4 MG: 4 INJECTION, SOLUTION INTRAMUSCULAR; INTRAVENOUS at 10:43

## 2019-09-11 RX ADMIN — SODIUM CHLORIDE 1000 ML: 9 INJECTION, SOLUTION INTRAVENOUS at 10:45

## 2019-09-11 RX ADMIN — ONDANSETRON 4 MG: 2 INJECTION INTRAMUSCULAR; INTRAVENOUS at 10:43

## 2019-09-11 ASSESSMENT — PAIN SCALES - GENERAL
PAINLEVEL_OUTOF10: 10
PAINLEVEL_OUTOF10: 4
PAINLEVEL_OUTOF10: 10
PAINLEVEL_OUTOF10: 5
PAINLEVEL_OUTOF10: 5

## 2019-09-11 ASSESSMENT — PAIN DESCRIPTION - LOCATION
LOCATION: ABDOMEN
LOCATION: ABDOMEN

## 2019-09-11 ASSESSMENT — PAIN DESCRIPTION - PAIN TYPE
TYPE: ACUTE PAIN
TYPE: ACUTE PAIN

## 2019-09-11 ASSESSMENT — PAIN DESCRIPTION - FREQUENCY: FREQUENCY: CONTINUOUS

## 2019-09-11 NOTE — ED PROVIDER NOTES
Needs    Financial resource strain: None    Food insecurity:     Worry: None     Inability: None    Transportation needs:     Medical: None     Non-medical: None   Tobacco Use    Smoking status: Former Smoker     Packs/day: 0.25     Years: 15.00     Pack years: 3.75     Last attempt to quit: 2012     Years since quittin.4    Smokeless tobacco: Never Used   Substance and Sexual Activity    Alcohol use: Yes     Comment: social    Drug use: No    Sexual activity: Yes     Partners: Male   Lifestyle    Physical activity:     Days per week: None     Minutes per session: None    Stress: None   Relationships    Social connections:     Talks on phone: None     Gets together: None     Attends Zoroastrian service: None     Active member of club or organization: None     Attends meetings of clubs or organizations: None     Relationship status: None    Intimate partner violence:     Fear of current or ex partner: None     Emotionally abused: None     Physically abused: None     Forced sexual activity: None   Other Topics Concern    None   Social History Narrative    None       SCREENINGS           PHYSICAL EXAM    (5+ for level 4, 8+ for level 5)     ED Triage Vitals   BP Temp Temp src Pulse Resp SpO2 Height Weight   -- -- -- -- -- -- -- --       Physical Exam   Constitutional: She is oriented to person, place, and time. She appears well-developed and well-nourished. No distress. HENT:   Head: Normocephalic and atraumatic. Right Ear: External ear normal.   Left Ear: External ear normal.   Nose: Nose normal.   Mouth/Throat: Oropharynx is clear and moist. No oropharyngeal exudate. Eyes: Pupils are equal, round, and reactive to light. Conjunctivae and EOM are normal. Right eye exhibits no discharge. Left eye exhibits no discharge. No scleral icterus. Neck: Normal range of motion. Neck supple. No JVD present. No tracheal deviation present.    Cardiovascular: Normal rate, regular rhythm, normal heart sounds [id number]  [phone]    If symptoms worsen      DISCHARGE MEDICATIONS:  Discharge Medication List as of 9/11/2019 12:39 PM      START taking these medications    Details   acetaminophen (TYLENOL) 500 MG tablet Take 2 tablets by mouth every 8 hours as needed for Pain, Disp-540 tablet, R-1Print      ondansetron (ZOFRAN) 4 MG tablet Take 1 tablet by mouth every 8 hours as needed for Nausea, Disp-10 tablet, R-0Print                (Please note:  Portions of this note were completed with a voice recognition program. Efforts were made to edit the dictations but occasionally words and phrases are mis-transcribed.)    Form v2016. J.5-cn    Myriam Jose DO (electronically signed)  Emergency Medicine Provider              Kailey Boyd DO  09/11/19 [id number]

## 2019-09-12 ENCOUNTER — APPOINTMENT (OUTPATIENT)
Dept: PHYSICAL THERAPY | Age: 44
End: 2019-09-12
Payer: MEDICAID

## 2019-09-12 ENCOUNTER — HOSPITAL ENCOUNTER (OUTPATIENT)
Dept: PHYSICAL THERAPY | Age: 44
Setting detail: THERAPIES SERIES
Discharge: HOME | End: 2019-09-12
Payer: MEDICAID

## 2019-09-12 NOTE — FLOWSHEET NOTE
Physical Therapy  Cancellation/No-show Note  Patient Name:  Bushra Gupta  :  1975   Date:  2019  Cancelled visits to date: 2  No-shows to date: 0    For today's appointment patient:  [x]  Cancelled  []  Rescheduled appointment  []  No-show     Reason given by patient:  [x]  Patient ill  []  Conflicting appointment  []  No transportation    []  Conflict with work  []  No reason given  [x]  Other:     Comments:   Called to reports she was in [place] with ovarian cysts.      Electronically signed by:  Bowen Paul,

## 2019-09-17 ENCOUNTER — HOSPITAL ENCOUNTER (OUTPATIENT)
Dept: PHYSICAL THERAPY | Age: 44
Setting detail: THERAPIES SERIES
Discharge: HOME | End: 2019-09-17
Payer: MEDICAID

## 2019-09-17 PROCEDURE — 97113 AQUATIC THERAPY/EXERCISES: CPT

## 2019-09-17 PROCEDURE — 97150 GROUP THERAPEUTIC PROCEDURES: CPT

## 2019-09-17 PROCEDURE — G0283 ELEC STIM OTHER THAN WOUND: HCPCS

## 2019-09-17 PROCEDURE — 97140 MANUAL THERAPY 1/> REGIONS: CPT

## 2019-09-17 NOTE — FLOWSHEET NOTE
Physical Therapy Daily Treatment Note  Date:  2019    Patient Name:  Rosy Shin :  1975  MRN: [id number]    Restrictions/Precautions: Restrictions/Precautions  Restrictions/Precautions: Fall Risk(none)    Pertinent Medical History:      Medical/Treatment Diagnosis Information:  · Diagnosis: fibromyalgia  · Treatment Diagnosis: increased pain    Insurance/Certification information:  PT Insurance Information: Ej  Physician Information:  Referring Practitioner: Dr. Kathy Maurer of care signed (Y/N):      Visit# / total visits:    Pain level:   7-8/10     Functional Assessment: at eval  Test:UEFS  Score:43.75    Progress Note: []  Yes  [x]  No  Next due by: Visit #10      History of Injury: Patient has had diagnosis of fibromyalgia for several years she had water therapy at the same time last year but did not continue secondary to personal issues. She is helping maintain her symptoms with holistis techniques that have helped calm down the legs but the core is worse since abdominal herniasurgery ilast year. She gets a massage 1 x a week    Subjective:   Stressful morning leading to inc pain. Objective:  Observation:   Test measurements:  See eval      Exercises:  Exercise/Equipment Resistance/Repetitions Other comments        Stretches  Mid back/ UT, rhomboid, ped, post shld Reviewed from previous PTInstructed may resume at home prn                  STM to neck , UT, scapular X 15 min supine        IFC 4 pads  UT/ scapular with MHP  Neck/back X 15 min  Supine with wedge          Other Therapeutic Activities:  Patient was educated on diagnosis, plan of care and prognosis of their complaint. Also, frequency and duration of treatments was discussed. Patient was informed of the attendance policy and issued a copy for their records. Home Exercise Program: Patient given home exercise program and demonstrates correct technique. HEP booklet also issued.     Timed Code Treatment Minutes: 15    Total Treatment Minutes:  35     Assessment:  [] Patient tolerated treatment well [] Patient limited by fatigue  [x] Patient limited by pain  [] Patient limited by other medical complications  [x] Other:  Reviewed use of TENS unit. Decreased pain after Rx        Prognosis: [x] Good [] Fair  [] Poor    Goals:    Short term goals  Time Frame for Short term goals: 2 weeks  Short term goal 1: Decrease pain to 6/10 at worst  Short term goal 2: Patient tolerates land and water therapy without increased pain  Short term goal 3: Patient able to open doors more easily      Long term goals  Time Frame for Long term goals : discharge  Long term goal 1: Decrease pain to 2-3/10 at worst  Long term goal 2: Patient independent in management of pain symptoms  Long term goal 3: patient able to cook without increased pain.        Patient Requires Follow-up: [x] Yes  [] No    Plan:   [x] Continue per plan of care [] Alter current plan (see comments)  [] Plan of care initiated [] Hold pending MD visit [] Discharge    Plan for Next Session: cont modalities    Electronically signed by:  Chrystal Hines, PT [id number]

## 2019-09-17 NOTE — FLOWSHEET NOTE
Physical Therapy Aquatic Flow Sheet   Date:  2019    Patient Name:  Richard Wilkinson    :  1975     Restrictions/Precautions: Restrictions/Precautions  Restrictions/Precautions: Fall Risk(none)    Pertinent Medical History:       Medical/Treatment Diagnosis Information:  · Diagnosis: fibromyalgia  · Treatment Diagnosis: increased pain     Insurance/Certification information:  PT Insurance Information: Ej  Physician Information:  Referring Practitioner: Dr. Rabia Watson of care signed (Y/N):       Visit# / total visits:     Pain level:       5/10      Progress Note: []  Yes  [x]  No  Next due by: Visit #10:      History of Injury:Patient has had diagnosis of fibromyalgia for several years she had water therapy at the same time last year but did not continue secondary to personal issues. Taylor Vaughan is helping maintain her symptoms with holistis techniques that have helped calm down the legs but the core is worse since abdominal herniasurgery ilast year. Taylor Vaughan gets a massage 1 x a week    Subjective:  Reports has B ovarian cysts and B pulmonary nodules.      Objective:   Observation:  See eval   Test measurements:      Key  B= Belt DB= Dumbells T= Theratube   G=Gloves N= Noodles W= Weights   P= Paddles WW=Water Walker K= Kickboard        Transfers:   steps ind      % Immersion: 75%            Ambulation:  laps ind Exercises UE:  B    Forwards 5 Shoulder Shrugs 10    Lateral 1 Shoulder circles 10    Marching 1 Scapular Retraction 10    Retro   Rolling 10    [place]  Push Downs 10     IR/ER 10     Punching    Stretching: B  30 sec  Rowing 10   Gastroc/Soleus  2 Elbow Flex/Ext 10   Hamstring   Shldr Flex/Ext 10   Knee flex stretch   Shldr Abd/Add 10   Piriformis  1 Horiz Abd/Add 10   Hip flexor    Arm Circles 10   SKTC   PNF Diagonals 10   DKTC 1 min UE oscillations f/b, s/s    ITB   Wall Push-ups    Quad  Figure 8's    Mid back  1 Buoy pull/push downs    UT  Tband rows    Rhom 1 Tband lats

## 2019-09-19 ENCOUNTER — HOSPITAL ENCOUNTER (OUTPATIENT)
Dept: PHYSICAL THERAPY | Age: 44
Setting detail: THERAPIES SERIES
Discharge: HOME | End: 2019-09-19
Payer: MEDICAID

## 2019-09-19 PROCEDURE — G0283 ELEC STIM OTHER THAN WOUND: HCPCS

## 2019-09-19 PROCEDURE — 97530 THERAPEUTIC ACTIVITIES: CPT

## 2019-09-19 PROCEDURE — 97150 GROUP THERAPEUTIC PROCEDURES: CPT

## 2019-09-19 PROCEDURE — 97113 AQUATIC THERAPY/EXERCISES: CPT

## 2019-09-19 PROCEDURE — 97140 MANUAL THERAPY 1/> REGIONS: CPT

## 2019-09-19 NOTE — FLOWSHEET NOTE
Rhom 1 Tband lats    Post Shoulder  Lumbar Rot w/ paddles    Pec  1 Small ball pull downs                   diagonals      Wrist/ finger AROM 10 ea      Cervical:       AROM Flex       AROM Extension     LEs exercises:  B AROM Side Bending    Marching  10 AROM Rotation    Heel Raises  10 Chin tucks    Toe Raises  10 Chin nods     Squats  10      Hamstring Curls  10      Hip Flexion  10 Balance: Hip Abduction 10 SLS    Hip Circles 10 Tandem stance    TA set 10     NBOS eyes open    Glut Set 10 NBOS eyes closed    Hip Extension  Hand to Opposite Knee 10   Hip Adduction    Box Step  3 R/L   Hip IR   Noodle Stance     Hip ER  Stop/Go Gait    Fig 8's  Switch Gait                Seated: B Functional: B   Ankle Pumps 30 Step up forward 10   Ankle circles 10 Step up lateral    Knee flex & ext 30 Step down    Hip Abd & Adduction 30 [place] squats    Bicycle  30 [place] Lifts    Add Set with ball 10 Lunges forward    LX stab with med ball throws  Lunges lateral    Ankle INV  Lunges retro    Ankle EV  Lower ab curl with noodle      Upper ab curl with ball      Med ball straight lifts      Med ball diagonal lifts      Hydrorider          Noodle:      Leg Press  Deep Water:    Noodle hang at wall  Jog    Noodle hang/bike deep water 5 min    ind        relief Jumping Jacks      Heel to toe      Hand to opposite knee      Cross country skier      Rocking Horse        Individual Aquatic Timed Minutes:  15    Group Aquatic Timed Minutes:  35  Aquatic group therapy is the presence of more than 1 patient in the pool, at the same time. During that time each patient receives individualized instruction designed for their specific diagnosis. Treatment/Activity Tolerance:  [x] Patient tolerated treatment well [] Patient limited by fatique  [] Patient limited by pain  [] Patient limited by other medical complications  [x] Other: progressing with aquatic ex well. decreased pain 4/10 after aquatic ex.  Tolerating 50 min aquatic

## 2019-09-24 ENCOUNTER — HOSPITAL ENCOUNTER (OUTPATIENT)
Dept: PHYSICAL THERAPY | Age: 44
Setting detail: THERAPIES SERIES
Discharge: HOME | End: 2019-09-24
Payer: MEDICAID

## 2019-09-24 PROCEDURE — 97113 AQUATIC THERAPY/EXERCISES: CPT

## 2019-09-24 PROCEDURE — G0283 ELEC STIM OTHER THAN WOUND: HCPCS

## 2019-09-24 PROCEDURE — 97150 GROUP THERAPEUTIC PROCEDURES: CPT

## 2019-09-24 PROCEDURE — 97140 MANUAL THERAPY 1/> REGIONS: CPT

## 2019-09-24 NOTE — FLOWSHEET NOTE
prognosis of their complaint. Also, frequency and duration of treatments was discussed. Patient was informed of the attendance policy and issued a copy for their records. Home Exercise Program: Patient given home exercise program and demonstrates correct technique. HEP booklet also issued. Timed Code Treatment Minutes: 15    Total Treatment Minutes:  35    Assessment:  [x] Patient tolerated treatment well [] Patient limited by fatigue  [] Patient limited by pain  [] Patient limited by other medical complications  [x] Other:   Decreased pain after Rx        Prognosis: [x] Good [] Fair  [] Poor    Goals:    Short term goals  Time Frame for Short term goals: 2 weeks  Short term goal 1: Decrease pain to 6/10 at worst  Short term goal 2: Patient tolerates land and water therapy without increased pain  Short term goal 3: Patient able to open doors more easily      Long term goals  Time Frame for Long term goals : discharge  Long term goal 1: Decrease pain to 2-3/10 at worst  Long term goal 2: Patient independent in management of pain symptoms  Long term goal 3: patient able to cook without increased pain.        Patient Requires Follow-up: [x] Yes  [] No    Plan:   [x] Continue per plan of care [] Alter current plan (see comments)  [] Plan of care initiated [] Hold pending MD visit [] Discharge    Plan for Next Session: cont pool and modalities    Electronically signed by:  Balbina Pressley, PTA [id number]

## 2019-09-26 ENCOUNTER — HOSPITAL ENCOUNTER (OUTPATIENT)
Dept: PHYSICAL THERAPY | Age: 44
Setting detail: THERAPIES SERIES
Discharge: HOME | End: 2019-09-26
Payer: MEDICAID

## 2019-09-26 PROCEDURE — 97113 AQUATIC THERAPY/EXERCISES: CPT

## 2019-09-26 PROCEDURE — 97140 MANUAL THERAPY 1/> REGIONS: CPT | Performed by: CHIROPRACTOR

## 2019-09-26 PROCEDURE — G0283 ELEC STIM OTHER THAN WOUND: HCPCS | Performed by: CHIROPRACTOR

## 2019-09-26 PROCEDURE — 97150 GROUP THERAPEUTIC PROCEDURES: CPT

## 2019-09-26 NOTE — FLOWSHEET NOTE
min aquatic ex. Prognosis: [x] Good [] Fair  [] Poor    Patient Requires Follow-up: [x] Yes  [] No    Plan:   [x] Continue per plan of care [] Alter current plan (see comments)  [] Plan of care initiated [] Hold pending MD visit [] Discharge    Plan for Next Session:  Step downs, UE figure 8s , ITB stretch as tolerated.       Electronically signed by: Sonny Chakraborty,

## 2019-09-30 ENCOUNTER — OFFICE VISIT (OUTPATIENT)
Dept: INTERNAL MEDICINE CLINIC | Age: 44
End: 2019-09-30
Payer: MEDICAID

## 2019-09-30 VITALS
WEIGHT: 190 LBS | OXYGEN SATURATION: 98 % | HEIGHT: 66 IN | RESPIRATION RATE: 16 BRPM | DIASTOLIC BLOOD PRESSURE: 84 MMHG | BODY MASS INDEX: 30.53 KG/M2 | SYSTOLIC BLOOD PRESSURE: 120 MMHG | HEART RATE: 108 BPM

## 2019-09-30 DIAGNOSIS — R91.8 LUNG NODULES: ICD-10-CM

## 2019-09-30 DIAGNOSIS — Z00.00 WELL WOMAN EXAM (NO GYNECOLOGICAL EXAM): Primary | ICD-10-CM

## 2019-09-30 DIAGNOSIS — R91.8 MULTIPLE LUNG NODULES: ICD-10-CM

## 2019-09-30 DIAGNOSIS — M79.7 FIBROMYALGIA: ICD-10-CM

## 2019-09-30 LAB
CHOLESTEROL, TOTAL: 222 MG/DL (ref 0–199)
HDLC SERPL-MCNC: 70 MG/DL (ref 40–60)
LDL CHOLESTEROL CALCULATED: 145 MG/DL
TRIGL SERPL-MCNC: 34 MG/DL (ref 0–150)
TSH SERPL DL<=0.05 MIU/L-ACNC: 1.55 UIU/ML (ref 0.27–4.2)
VITAMIN D 25-HYDROXY: 31.9 NG/ML
VLDLC SERPL CALC-MCNC: 7 MG/DL

## 2019-09-30 PROCEDURE — 99396 PREV VISIT EST AGE 40-64: CPT | Performed by: INTERNAL MEDICINE

## 2019-09-30 PROCEDURE — 36415 COLL VENOUS BLD VENIPUNCTURE: CPT | Performed by: INTERNAL MEDICINE

## 2019-09-30 ASSESSMENT — ENCOUNTER SYMPTOMS
GASTROINTESTINAL NEGATIVE: 1
BACK PAIN: 0
EYES NEGATIVE: 1
ALLERGIC/IMMUNOLOGIC NEGATIVE: 1
CHEST TIGHTNESS: 0
RESPIRATORY NEGATIVE: 1
COUGH: 0
SHORTNESS OF BREATH: 0
CONSTIPATION: 0

## 2019-09-30 NOTE — PROGRESS NOTES
tab daily, Disp: 30 capsule, Rfl: 5    Cholecalciferol (VITAMIN D3) 2000 units TABS, TAKE 1 TABLET BY MOUTH ONCE DAILY, Disp: 30 tablet, Rfl: 5    vitamin A 53228 units capsule, Take 10,000 Units by mouth daily, Disp: , Rfl:     omeprazole (PRILOSEC) 20 MG delayed release capsule, Take 40 mg by mouth daily, Disp: , Rfl:     Lactobacillus (PROBIOTIC ACIDOPHILUS PO), Take 1 capsule by mouth daily , Disp: , Rfl:     magnesium oxide (MAG-OX) 400 MG tablet, Take 400 mg by mouth daily, Disp: , Rfl:     ferrous sulfate 325 (65 Fe) MG tablet, Take 325 mg by mouth daily (with breakfast), Disp: , Rfl:     Alpha-Lipoic Acid 200 MG CAPS, Take by mouth, Disp: , Rfl:     polyethylene glycol (GLYCOLAX) powder, MIX 17 GRAMS (1 CAPFUL) WITH 8 OZ OF FAVORITE LIQUID AND TAKE  BY MOUTH DAILY, Disp: 1 Bottle, Rfl: 3    valACYclovir (VALTREX) 500 MG tablet, Take 500 mg by mouth 3 times daily as needed, Disp: , Rfl:   Past Medical History:   Diagnosis Date    Anxiety     Chest wall pain     Constipation     Costochondritis     Depression     Fibroid, uterine     Fibromyalgia     Pleurisy     Sickle cell trait ([facility] [address] 6/9/2016     Past Surgical History:   Procedure Laterality Date    BREAST SURGERY      bx    COLONOSCOPY N/A 11/15/2018    COLONOSCOPY DIAGNOSTIC performed by Mulu Hawkins MD at [address] [facility] ([place] [facility])  07/24/2018    epigastric hernia, lipomas of right thigh, left thigh, left chest wall x 2, left posterior arm, right posterior arm, right lower back    MOUTH SURGERY N/A March 1, 2016    wisdom teeth    MYOMECTOMY      OTHER SURGICAL HISTORY  08/16/1994    vaginal delivery     Social History     Socioeconomic History    Marital status:      Spouse name: Not on file    Number of children: Not on file    Years of education: Not on file    Highest education level: Not on file   Occupational History    Not on file   Social Needs    Financial resource strain: Not on file   [place]

## 2019-10-01 ENCOUNTER — HOSPITAL ENCOUNTER (OUTPATIENT)
Dept: PHYSICAL THERAPY | Age: 44
Setting detail: THERAPIES SERIES
Discharge: HOME | End: 2019-10-01
Payer: MEDICAID

## 2019-10-01 PROCEDURE — G0283 ELEC STIM OTHER THAN WOUND: HCPCS

## 2019-10-01 PROCEDURE — 97113 AQUATIC THERAPY/EXERCISES: CPT

## 2019-10-01 PROCEDURE — 97140 MANUAL THERAPY 1/> REGIONS: CPT

## 2019-10-01 PROCEDURE — 97150 GROUP THERAPEUTIC PROCEDURES: CPT

## 2019-10-03 ENCOUNTER — HOSPITAL ENCOUNTER (OUTPATIENT)
Dept: PHYSICAL THERAPY | Age: 44
Setting detail: THERAPIES SERIES
Discharge: HOME | End: 2019-10-03
Payer: MEDICAID

## 2019-10-03 PROCEDURE — 97150 GROUP THERAPEUTIC PROCEDURES: CPT

## 2019-10-03 PROCEDURE — 97140 MANUAL THERAPY 1/> REGIONS: CPT

## 2019-10-03 PROCEDURE — 97113 AQUATIC THERAPY/EXERCISES: CPT

## 2019-10-03 PROCEDURE — G0283 ELEC STIM OTHER THAN WOUND: HCPCS

## 2019-10-08 ENCOUNTER — HOSPITAL ENCOUNTER (OUTPATIENT)
Dept: PHYSICAL THERAPY | Age: 44
Setting detail: THERAPIES SERIES
Discharge: HOME | End: 2019-10-08
Payer: MEDICAID

## 2019-10-08 PROCEDURE — G0283 ELEC STIM OTHER THAN WOUND: HCPCS

## 2019-10-08 PROCEDURE — 97140 MANUAL THERAPY 1/> REGIONS: CPT

## 2019-10-09 ENCOUNTER — HOSPITAL ENCOUNTER (OUTPATIENT)
Dept: CT IMAGING | Age: 44
Discharge: HOME | End: 2019-10-09
Payer: MEDICAID

## 2019-10-09 ENCOUNTER — APPOINTMENT (OUTPATIENT)
Dept: WOMENS IMAGING | Age: 44
End: 2019-10-09
Payer: MEDICAID

## 2019-10-09 DIAGNOSIS — R91.8 LUNG NODULES: ICD-10-CM

## 2019-10-09 PROCEDURE — 71260 CT THORAX DX C+: CPT

## 2019-10-09 PROCEDURE — 6360000004 HC RX CONTRAST MEDICATION: Performed by: INTERNAL MEDICINE

## 2019-10-09 RX ADMIN — IOVERSOL 100 ML: 678 INJECTION INTRA-ARTERIAL; INTRAVENOUS at 14:20

## 2019-10-10 ENCOUNTER — HOSPITAL ENCOUNTER (OUTPATIENT)
Dept: PHYSICAL THERAPY | Age: 44
Setting detail: THERAPIES SERIES
Discharge: HOME | End: 2019-10-10
Payer: MEDICAID

## 2019-10-10 DIAGNOSIS — R91.8 LUNG NODULES: Primary | ICD-10-CM

## 2019-10-10 PROCEDURE — 97140 MANUAL THERAPY 1/> REGIONS: CPT

## 2019-10-10 PROCEDURE — 97150 GROUP THERAPEUTIC PROCEDURES: CPT

## 2019-10-10 PROCEDURE — 97113 AQUATIC THERAPY/EXERCISES: CPT

## 2019-10-14 ENCOUNTER — HOSPITAL ENCOUNTER (OUTPATIENT)
Dept: WOMENS IMAGING | Age: 44
Discharge: HOME | End: 2019-10-14
Payer: MEDICAID

## 2019-10-14 ENCOUNTER — TELEPHONE (OUTPATIENT)
Dept: INTERNAL MEDICINE CLINIC | Age: 44
End: 2019-10-14

## 2019-10-14 DIAGNOSIS — Z12.31 BREAST CANCER SCREENING BY MAMMOGRAM: ICD-10-CM

## 2019-10-14 PROCEDURE — 77067 SCR MAMMO BI INCL CAD: CPT

## 2019-10-15 ENCOUNTER — HOSPITAL ENCOUNTER (OUTPATIENT)
Dept: PHYSICAL THERAPY | Age: 44
Setting detail: THERAPIES SERIES
Discharge: HOME | End: 2019-10-15
Payer: MEDICAID

## 2019-10-15 PROCEDURE — 97140 MANUAL THERAPY 1/> REGIONS: CPT

## 2019-10-15 PROCEDURE — G0283 ELEC STIM OTHER THAN WOUND: HCPCS

## 2019-10-15 PROCEDURE — 97113 AQUATIC THERAPY/EXERCISES: CPT

## 2019-10-15 PROCEDURE — 97150 GROUP THERAPEUTIC PROCEDURES: CPT

## 2019-10-17 ENCOUNTER — HOSPITAL ENCOUNTER (OUTPATIENT)
Dept: PHYSICAL THERAPY | Age: 44
Setting detail: THERAPIES SERIES
Discharge: HOME | End: 2019-10-17
Payer: MEDICAID

## 2019-10-17 PROCEDURE — 97140 MANUAL THERAPY 1/> REGIONS: CPT

## 2019-10-17 PROCEDURE — 97113 AQUATIC THERAPY/EXERCISES: CPT

## 2019-10-17 PROCEDURE — G0283 ELEC STIM OTHER THAN WOUND: HCPCS

## 2019-10-17 PROCEDURE — 97530 THERAPEUTIC ACTIVITIES: CPT

## 2019-11-14 ENCOUNTER — OFFICE VISIT (OUTPATIENT)
Dept: RHEUMATOLOGY | Age: 44
End: 2019-11-14
Payer: MEDICAID

## 2019-11-14 VITALS
DIASTOLIC BLOOD PRESSURE: 64 MMHG | WEIGHT: 195 LBS | SYSTOLIC BLOOD PRESSURE: 108 MMHG | BODY MASS INDEX: 31.34 KG/M2 | HEIGHT: 66 IN

## 2019-11-14 DIAGNOSIS — M79.7 FIBROMYALGIA: Primary | ICD-10-CM

## 2019-11-14 PROCEDURE — 1036F TOBACCO NON-USER: CPT | Performed by: INTERNAL MEDICINE

## 2019-11-14 PROCEDURE — 99215 OFFICE O/P EST HI 40 MIN: CPT | Performed by: INTERNAL MEDICINE

## 2019-11-14 PROCEDURE — G8417 CALC BMI ABV UP PARAM F/U: HCPCS | Performed by: INTERNAL MEDICINE

## 2019-11-14 PROCEDURE — G8484 FLU IMMUNIZE NO ADMIN: HCPCS | Performed by: INTERNAL MEDICINE

## 2019-11-14 PROCEDURE — G8427 DOCREV CUR MEDS BY ELIG CLIN: HCPCS | Performed by: INTERNAL MEDICINE

## 2019-11-14 RX ORDER — DULOXETIN HYDROCHLORIDE 30 MG/1
30 CAPSULE, DELAYED RELEASE ORAL 3 TIMES DAILY
Qty: 90 CAPSULE | Refills: 1 | Status: SHIPPED | OUTPATIENT
Start: 2019-11-14 | End: 2019-12-18 | Stop reason: SDUPTHER

## 2019-12-18 ENCOUNTER — TELEPHONE (OUTPATIENT)
Dept: RHEUMATOLOGY | Age: 44
End: 2019-12-18

## 2019-12-18 RX ORDER — DULOXETIN HYDROCHLORIDE 60 MG/1
30 CAPSULE, DELAYED RELEASE ORAL DAILY
Qty: 90 CAPSULE | Refills: 0 | Status: SHIPPED | OUTPATIENT
Start: 2019-12-18 | End: 2020-02-10

## 2019-12-30 DIAGNOSIS — M79.7 FIBROMYALGIA: ICD-10-CM

## 2019-12-30 RX ORDER — METHOCARBAMOL 500 MG/1
500 TABLET, FILM COATED ORAL 3 TIMES DAILY
Qty: 90 TABLET | Refills: 1 | Status: SHIPPED | OUTPATIENT
Start: 2019-12-30 | End: 2020-01-29

## 2020-01-01 NOTE — FLOWSHEET NOTE
Physical Therapy Daily Treatment Note  Date:  2019    Patient Name:  Pravin Hay :  1975  MRN: [id number]    Restrictions/Precautions: Restrictions/Precautions  Restrictions/Precautions: Fall Risk(none)    Pertinent Medical History:      Medical/Treatment Diagnosis Information:  · Diagnosis: fibromyalgia  · Treatment Diagnosis: increased pain    Insurance/Certification information:  PT Insurance Information: Ej  Physician Information:  Referring Practitioner: Dr. Yolanda Rodriguez of care signed (Y/N):      Visit# / total visits:    Pain level:   10     Functional Assessment: at eval  Test:UEFS  Score:43.75    Progress Note: [x]  Yes  []  No  Next due by: Visit #10      History of Injury: Patient has had diagnosis of fibromyalgia for several years she had water therapy at the same time last year but did not continue secondary to personal issues. She is helping maintain her symptoms with holistis techniques that have helped calm down the legs but the core is worse since abdominal herniasurgery ilast year.   She gets a massage 1 x a week    Subjective:  Doing much better than last Thursday  Progress Note ( 19 )  * feels PT has been helpful   * pt rates pain as 5-6/10 at worst; overall 50% improved  * ovarian cysts have her feeling bad all over  * feels she could use more therapy   * opening heavy door still difficult  * generally feels dec pain for a few hours after PT and inc back at bedtime      Objective:  Observation:   Test measurements:  See eval      Exercises:  Exercise/Equipment Resistance/Repetitions Other comments        Stretches  Mid back/ UT, rhomboid, ped, post shld Reviewed from previous PTInstructed may resume at home prn                  STM to neck , UT, scapular X 15 min supine        IFC 4 pads  UT/ scapular with MHP  Neck/back X 15 min  Supine with wedge          Other Therapeutic Activities:  Patient was educated on diagnosis, plan of care and  (normal spontaneous vaginal delivery)

## 2020-02-10 RX ORDER — LANOLIN ALCOHOL/MO/W.PET/CERES
1000 CREAM (GRAM) TOPICAL DAILY
COMMUNITY

## 2020-02-12 ENCOUNTER — ANESTHESIA EVENT (OUTPATIENT)
Dept: ENDOSCOPY | Age: 45
End: 2020-02-12
Payer: MEDICAID

## 2020-02-13 ENCOUNTER — HOSPITAL ENCOUNTER (OUTPATIENT)
Age: 45
Setting detail: OUTPATIENT SURGERY
Discharge: HOME | End: 2020-02-13
Attending: INTERNAL MEDICINE | Admitting: INTERNAL MEDICINE
Payer: MEDICAID

## 2020-02-13 ENCOUNTER — ANESTHESIA (OUTPATIENT)
Dept: ENDOSCOPY | Age: 45
End: 2020-02-13
Payer: MEDICAID

## 2020-02-13 VITALS — OXYGEN SATURATION: 99 % | DIASTOLIC BLOOD PRESSURE: 83 MMHG | SYSTOLIC BLOOD PRESSURE: 113 MMHG

## 2020-02-13 VITALS
TEMPERATURE: 97 F | DIASTOLIC BLOOD PRESSURE: 59 MMHG | HEART RATE: 74 BPM | RESPIRATION RATE: 14 BRPM | BODY MASS INDEX: 30.51 KG/M2 | WEIGHT: 194.38 LBS | HEIGHT: 67 IN | SYSTOLIC BLOOD PRESSURE: 135 MMHG | OXYGEN SATURATION: 100 %

## 2020-02-13 LAB — PREGNANCY, URINE: NEGATIVE

## 2020-02-13 PROCEDURE — 2500000003 HC RX 250 WO HCPCS

## 2020-02-13 PROCEDURE — 7100000011 HC PHASE II RECOVERY - ADDTL 15 MIN: Performed by: INTERNAL MEDICINE

## 2020-02-13 PROCEDURE — 3700000000 HC ANESTHESIA ATTENDED CARE: Performed by: INTERNAL MEDICINE

## 2020-02-13 PROCEDURE — 7100000010 HC PHASE II RECOVERY - FIRST 15 MIN: Performed by: INTERNAL MEDICINE

## 2020-02-13 PROCEDURE — 3609017100 HC EGD: Performed by: INTERNAL MEDICINE

## 2020-02-13 PROCEDURE — 3700000001 HC ADD 15 MINUTES (ANESTHESIA): Performed by: INTERNAL MEDICINE

## 2020-02-13 PROCEDURE — 2580000003 HC RX 258: Performed by: ANESTHESIOLOGY

## 2020-02-13 PROCEDURE — 6360000002 HC RX W HCPCS

## 2020-02-13 PROCEDURE — 84703 CHORIONIC GONADOTROPIN ASSAY: CPT

## 2020-02-13 RX ORDER — ONDANSETRON 2 MG/ML
INJECTION INTRAMUSCULAR; INTRAVENOUS PRN
Status: DISCONTINUED | OUTPATIENT
Start: 2020-02-13 | End: 2020-02-13 | Stop reason: SDUPTHER

## 2020-02-13 RX ORDER — LIDOCAINE HYDROCHLORIDE 20 MG/ML
INJECTION, SOLUTION EPIDURAL; INFILTRATION; INTRACAUDAL; PERINEURAL PRN
Status: DISCONTINUED | OUTPATIENT
Start: 2020-02-13 | End: 2020-02-13 | Stop reason: SDUPTHER

## 2020-02-13 RX ORDER — ONDANSETRON 2 MG/ML
4 INJECTION INTRAMUSCULAR; INTRAVENOUS
Status: DISCONTINUED | OUTPATIENT
Start: 2020-02-13 | End: 2020-02-13 | Stop reason: HOSPADM

## 2020-02-13 RX ORDER — SODIUM CHLORIDE 9 MG/ML
INJECTION, SOLUTION INTRAVENOUS CONTINUOUS
Status: DISCONTINUED | OUTPATIENT
Start: 2020-02-13 | End: 2020-02-13 | Stop reason: HOSPADM

## 2020-02-13 RX ORDER — PROPOFOL 10 MG/ML
INJECTION, EMULSION INTRAVENOUS PRN
Status: DISCONTINUED | OUTPATIENT
Start: 2020-02-13 | End: 2020-02-13 | Stop reason: SDUPTHER

## 2020-02-13 RX ORDER — SODIUM CHLORIDE 0.9 % (FLUSH) 0.9 %
10 SYRINGE (ML) INJECTION PRN
Status: DISCONTINUED | OUTPATIENT
Start: 2020-02-13 | End: 2020-02-13 | Stop reason: HOSPADM

## 2020-02-13 RX ORDER — GLYCOPYRROLATE 0.2 MG/ML
INJECTION INTRAMUSCULAR; INTRAVENOUS PRN
Status: DISCONTINUED | OUTPATIENT
Start: 2020-02-13 | End: 2020-02-13 | Stop reason: SDUPTHER

## 2020-02-13 RX ORDER — SODIUM CHLORIDE 0.9 % (FLUSH) 0.9 %
10 SYRINGE (ML) INJECTION EVERY 12 HOURS SCHEDULED
Status: DISCONTINUED | OUTPATIENT
Start: 2020-02-13 | End: 2020-02-13 | Stop reason: HOSPADM

## 2020-02-13 RX ADMIN — PROPOFOL 100 MG: 10 INJECTION, EMULSION INTRAVENOUS at 11:21

## 2020-02-13 RX ADMIN — GLYCOPYRROLATE 0.2 MG: 0.2 INJECTION, SOLUTION INTRAMUSCULAR; INTRAVENOUS at 11:12

## 2020-02-13 RX ADMIN — ONDANSETRON 4 MG: 2 INJECTION INTRAMUSCULAR; INTRAVENOUS at 11:12

## 2020-02-13 RX ADMIN — LIDOCAINE HYDROCHLORIDE 50 MG: 20 INJECTION, SOLUTION EPIDURAL; INFILTRATION; INTRACAUDAL; PERINEURAL at 11:21

## 2020-02-13 RX ADMIN — SODIUM CHLORIDE: 0.9 INJECTION, SOLUTION INTRAVENOUS at 10:33

## 2020-02-13 ASSESSMENT — PAIN SCALES - GENERAL
PAINLEVEL_OUTOF10: 0

## 2020-02-13 ASSESSMENT — PAIN - FUNCTIONAL ASSESSMENT
PAIN_FUNCTIONAL_ASSESSMENT: PREVENTS OR INTERFERES SOME ACTIVE ACTIVITIES AND ADLS
PAIN_FUNCTIONAL_ASSESSMENT: 0-10

## 2020-02-13 ASSESSMENT — PAIN DESCRIPTION - DESCRIPTORS: DESCRIPTORS: SPASM

## 2020-02-13 ASSESSMENT — LIFESTYLE VARIABLES: SMOKING_STATUS: 0

## 2020-02-13 NOTE — BRIEF OP NOTE
Brief Postoperative Note    Sisi Judge  YOB: 1975  [id number]    Pre-operative Diagnosis: Epigastric pain, nausea    Post-operative Diagnosis: Same    Procedure: EGD    Anesthesia: MAC    Surgeons/Assistants: Elvin Rod MD    Estimated Blood Loss: None    Complications: None    Specimens: Was Not Obtained    Findings: See dictated report    Electronically signed by Elvin Rod MD on 2/13/2020 at 11:29 AM

## 2020-02-13 NOTE — PROGRESS NOTES
Dr. Abi Beckwith here to speak with pt. And her . Discharge instructions discussed. Understanding verbalized. Patient and responsible adult verbalized understanding of discharge instructions, sedation medication, and potential complications including pain. Patient instructed to call Doctor if complications occur.
you have a living will and a durable power of  for healthcare, please bring in a copy. As part of our patient safety program to minimize surgical site infections, we ask you to do the following:    · Please notify your surgeon if you develop any illness between         now and the  day of your surgery. · This includes a cough, cold, fever, sore throat, nausea,         or vomiting, and diarrhea, etc.  ·  Please notify your surgeon if you experience dizziness, shortness         of breath or blurred vision between now and the time of your surgery. You may shower the night before surgery or the morning of   your surgery with an antibacterial soap. You will need to bring a photo ID and insurance card    [facility] has an onsite pharmacy, would you like to utilize our pharmacy     If you will be staying overnight and use a C-pap machine, please bring   your C-pap to hospital     Our goal is to provide you with excellent care, therefore, visitors will be limited to two(2) in the room at a time so that we may focus on providing this care for you. Please contact pre-admission testing if you have any further questions. [facility] phone number:  [phone]  Please note these are generalized instructions for all surgical cases, you may be provided with more specific instructions according to your surgery.

## 2020-02-13 NOTE — H&P
[facility]   Pre-operative History and Physical    Patient: Lizeth Sandoval  : 1975  Acct#:         HISTORY OF PRESENT ILLNESS:    The patient is a 38 y.o. female  who presents with epigastric pain, nausea  Past Medical History:        Diagnosis Date    Anxiety     Chest wall pain     Constipation     Costochondritis     Depression     Fibroid, uterine     Fibromyalgia     Pleurisy     Sickle cell trait ([address] 2016     Past Surgical History:        Procedure Laterality Date    BREAST SURGERY      bx    COLONOSCOPY N/A 11/15/2018    COLONOSCOPY DIAGNOSTIC performed by Dani Diaz MD at [address] ([place] [address])  2018    epigastric hernia, lipomas of right thigh, left thigh, left chest wall x 2, left posterior arm, right posterior arm, right lower back    MOUTH SURGERY N/A 2016    wisdom teeth    MYOMECTOMY      OTHER SURGICAL HISTORY  1994    vaginal delivery     Medications prior to admission:   Prior to Admission medications    Medication Sig Start Date End Date Taking?  Authorizing Provider   vitamin B-12 (CYANOCOBALAMIN) 1000 MCG tablet Take 1,000 mcg by mouth daily   Yes Historical Provider, MD   Handicap Placard MISC by Does not apply route 6 months  Dx fibromyalgia 19  Yes Kassy Pizarro MD   celecoxib (CELEBREX) 100 MG capsule TAKE 1 CAPSULE BY MOUTH TWICE DAILY 19  Yes Iggy Mccann MD   DULoxetine (CYMBALTA) 60 MG extended release capsule Take 1 tab daily 19  Yes Iggy Mccann MD   Cholecalciferol (VITAMIN D3) 2000 units TABS TAKE 1 TABLET BY MOUTH ONCE DAILY 18  Yes Iggy Mccann MD   vitamin A 89917 units capsule Take 10,000 Units by mouth daily   Yes Historical Provider, MD   omeprazole (PRILOSEC) 20 MG delayed release capsule Take 40 mg by mouth daily   Yes Historical Provider, MD   Lactobacillus (PROBIOTIC ACIDOPHILUS PO) Take 1 capsule by mouth daily    Yes Historical Provider, MD   polyethylene glycol ([address] [place] [address])

## 2020-02-13 NOTE — ANESTHESIA PRE PROCEDURE
Department of Anesthesiology  Preprocedure Note       Name:  Mery Summers   Age:  38 y.o.  :  1975                                          MRN:  [id number]         Date:  2020      Surgeon: Litzy Walls):  Yue Galo MD    Procedure: EGD ESOPHAGOGASTRODUODENOSCOPY (N/A )    Medications prior to admission:   Prior to Admission medications    Medication Sig Start Date End Date Taking? Authorizing Provider   vitamin B-12 (CYANOCOBALAMIN) 1000 MCG tablet Take 1,000 mcg by mouth daily    Historical Provider, MD   Handicap Placard MISC by Does not apply route 6 months  Dx fibromyalgia 19   Lauren Sacks, MD   acetaminophen (TYLENOL) 500 MG tablet Take 2 tablets by mouth every 8 hours as needed for Pain 19   Jacob Reyez DO   celecoxib (CELEBREX) 100 MG capsule TAKE 1 CAPSULE BY MOUTH TWICE DAILY 19   Rony Spicer MD   DULoxetine (CYMBALTA) 60 MG extended release capsule Take 1 tab daily 19   Rony Spicer MD   Cholecalciferol (VITAMIN D3) 2000 units TABS TAKE 1 TABLET BY MOUTH ONCE DAILY 18   Rony Spicer MD   vitamin A 54482 units capsule Take 10,000 Units by mouth daily    Historical Provider, MD   omeprazole (PRILOSEC) 20 MG delayed release capsule Take 40 mg by mouth daily    Historical Provider, MD   Lactobacillus (PROBIOTIC ACIDOPHILUS PO) Take 1 capsule by mouth daily     Historical Provider, MD   ferrous sulfate 325 (65 Fe) MG tablet Take 325 mg by mouth daily (with breakfast)    Historical Provider, MD   polyethylene glycol (GLYCOLAX) powder MIX 17 GRAMS (1 CAPFUL) WITH 8 OZ OF FAVORITE LIQUID AND TAKE  BY MOUTH DAILY 17   Lauren Sacks, MD   valACYclovir (VALTREX) 500 MG tablet Take 500 mg by mouth 3 times daily as needed    Historical Provider, MD       Current medications:    No current facility-administered medications for this visit. No current outpatient medications on file.      Facility-Administered Medications Ordered in Other Visits Medication Dose Route Frequency Provider Last Rate Last Dose    0.9 % sodium chloride infusion   Intravenous Continuous Jessica Fowler MD        sodium chloride flush 0.9 % injection 10 mL  10 mL Intravenous 2 times per day Jessica Fowler MD        sodium chloride flush 0.9 % injection 10 mL  10 mL Intravenous PRN Jessica Fowler MD           Allergies: Allergies   Allergen Reactions    Penicillins Hives       Problem List:    Patient Active Problem List   Diagnosis Code    Sickle cell trait ([facility] [address] D57.3    Anemia D64.9    Chest wall pain R07.89    Anxiety F41.9    Reactive depression F32.9    Constipation K59.00    Fibroid uterus D25.9    Fibromyalgia M79.7    Incarcerated epigastric hernia K43.6    Multiple lung nodules R91.8       Past Medical History:        Diagnosis Date    Anxiety     Chest wall pain     Constipation     Costochondritis     Depression     Fibroid, uterine     Fibromyalgia     Pleurisy     Sickle cell trait ([facility] [address] 2016       Past Surgical History:        Procedure Laterality Date    BREAST SURGERY      bx    COLONOSCOPY N/A 11/15/2018    [facility]ida [facility] [address] [facility] [facility]  2018    epigastric hernia, lipomas of right thigh, left thigh, left chest wall x 2, left posterior arm, right posterior arm, right lower back    MOUTH SURGERY N/A 2016    wisdom teeth    MYOMECTOMY      OTHER SURGICAL HISTORY  1994    vaginal delivery       Social History:    Social History     Tobacco Use    Smoking status: Former Smoker     Packs/day: 0.25     Years: 15.00     Pack years: 3.75     Last attempt to quit: 2012     Years since quittin.8    Smokeless tobacco: Never Used   Substance Use Topics    Alcohol use: Yes     Comment: social                                Counseling given: Not Answered      Vital Signs (Current): There were no vitals filed for this visit. BP Readings from Last 3 Encounters:   02/13/20 (!) 124/54   11/14/19 108/64   09/30/19 120/84       NPO Status:  prep 0600                                                                               BMI:   Wt Readings from Last 3 Encounters:   02/13/20 194 lb 6 oz (88.2 kg)   11/14/19 195 lb (88.5 kg)   09/30/19 190 lb (86.2 kg)     There is no height or weight on file to calculate BMI.    CBC:   Lab Results   Component Value Date    WBC 5.1 09/11/2019    RBC 3.92 09/11/2019    HGB 12.1 09/11/2019    HCT 36.4 09/11/2019    MCV 92.7 09/11/2019    RDW 14.2 09/11/2019     09/11/2019       CMP:   Lab Results   Component Value Date     09/11/2019    K 4.1 09/11/2019     09/11/2019    CO2 24 09/11/2019    BUN 16 09/11/2019    CREATININE 0.9 09/11/2019    GFRAA >60 09/11/2019    GFRAA >60 01/03/2013    AGRATIO 1.4 09/11/2019    LABGLOM >60 09/11/2019    GLUCOSE 91 09/11/2019    PROT 7.4 09/11/2019    PROT 7.3 08/27/2012    CALCIUM 9.2 09/11/2019    BILITOT 0.4 09/11/2019    ALKPHOS 54 09/11/2019    AST 19 09/11/2019    ALT 14 09/11/2019       POC Tests: No results for input(s): POCGLU, POCNA, POCK, POCCL, POCBUN, POCHEMO, POCHCT in the last 72 hours.     Coags:   Lab Results   Component Value Date    PROTIME 11.0 02/29/2016    INR 1.02 02/29/2016    APTT 29.4 11/20/2010       HCG (If Applicable):   Lab Results   Component Value Date    PREGTESTUR Negative 02/13/2020        ABGs: No results found for: PHART, PO2ART, BNH5PNO, CHX7GRJ, BEART, W8XCKLOD     Type & Screen (If Applicable):  No results found for: [facility]    Anesthesia Evaluation  Patient summary reviewed no history of anesthetic complications:   Airway: Mallampati: I  TM distance: >3 FB     Mouth opening: > = 3 FB Dental: normal exam         Pulmonary:Negative Pulmonary ROS breath sounds clear to auscultation      (-) COPD, asthma, sleep apnea and not a current smoker          Patient did not smoke on day of surgery. Cardiovascular:        (-) pacemaker, hypertension, past MI, CAD, CABG/stent and dysrhythmias    ECG reviewed  Rhythm: regular  Rate: normal           Beta Blocker:  Not on Beta Blocker         Neuro/Psych:   (+) neuromuscular disease (fibromy.):, psychiatric history:depression/anxiety    (-) seizures, TIA and CVA           GI/Hepatic/Renal:   (+) GERD:,      (-) liver disease, no renal disease and bowel prep       Endo/Other:    (+) blood dyscrasia (sickle cell trait)::., no malignancy/cancer. (-) diabetes mellitus, hypothyroidism, hyperthyroidism, no malignancy/cancer               Abdominal:           Vascular: negative vascular ROS. Anesthesia Plan      MAC     ASA 2       Induction: intravenous. MIPS: Prophylactic antiemetics administered. Anesthetic plan and risks discussed with patient. Plan discussed with CRNA. Attending anesthesiologist reviewed and agrees with Pre Eval content          This pre-anesthesia assessment may be used as a history and physical.    DOS STAFF ADDENDUM:    Pt seen and examined, chart reviewed (including anesthesia, drug and allergy history). No interval changes to history and physical examination. Anesthetic plan, risks, benefits, alternatives, and personnel involved discussed with patient. Patient verbalized an understanding and agrees to proceed.       Toby Velasco MD  February 13, 2020  10:28 AM      Toby Velasco MD   2/13/2020

## 2020-02-14 NOTE — PROCEDURES
[address]                                 PROCEDURE NOTE    PATIENT NAME: Marge Robertson                 :        1975  MED REC NO:   [id number]                          ROOM:  ACCOUNT NO:   [de-identified]                           ADMIT DATE: 2020  PROVIDER:     Catie Landers MD    EGD    DATE OF PROCEDURE:  2020    REFERRING PROVIDER:  Jeniffer Pyle. Lucas Desir MD    PATIENT HISTORY:  This is a 60-year-old female, outpatient. INSTRUMENT USED:  Olympus GIF-Q180. MEDICATIONS OF PROCEDURE:  The patient was premedicated with Diprivan  intravenously as administered by the anesthesiology service. INDICATIONS:  The patient has presented with epigastric pain and nausea. This is despite taking her daily PPI. She has been under more stress  recently and her fibromyalgia has flared up. The symptoms sound most  compatible with nonulcer dyspepsia. PROCEDURE:  The endoscope was inserted into the esophagus without  difficulty. The esophageal mucosa was entirely normal, revealing no  evidence of inflammatory or metaplastic change. The Z-line was located  at 38 cm. The stomach, duodenal bulb and descending duodenum were all  normal.    ESTIMATED BLOOD LOSS:  None. IMPRESSION:  Normal upper gastrointestinal endoscopy. PLAN:  I will consider adding a trial of dicyclomine 20 mg q.i.d. p.r.n.  I suspect that the patient's current symptoms are due to nonulcer  dyspepsia. She will follow up in the office.         Ashley Patel MD    D: 2020 11:44:20       T: 2020 12:28:05     [id number]  Job#: [id number]     Doc#: [id number]    CC:  Catie Landers MD

## 2020-02-19 RX ORDER — CELECOXIB 100 MG/1
CAPSULE ORAL
Qty: 60 CAPSULE | Refills: 0 | Status: SHIPPED | OUTPATIENT
Start: 2020-02-19

## 2020-03-03 RX ORDER — DULOXETIN HYDROCHLORIDE 60 MG/1
CAPSULE, DELAYED RELEASE ORAL
Qty: 30 CAPSULE | Refills: 1 | Status: SHIPPED | OUTPATIENT
Start: 2020-03-03

## 2020-03-05 ENCOUNTER — TELEPHONE (OUTPATIENT)
Dept: INTERNAL MEDICINE CLINIC | Age: 45
End: 2020-03-05
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
## 2020-06-16 ENCOUNTER — VIRTUAL VISIT (OUTPATIENT)
Dept: RHEUMATOLOGY | Age: 45
End: 2020-06-16

## 2020-06-16 PROCEDURE — 99214 OFFICE O/P EST MOD 30 MIN: CPT | Performed by: INTERNAL MEDICINE

## 2020-06-16 PROCEDURE — G8428 CUR MEDS NOT DOCUMENT: HCPCS | Performed by: INTERNAL MEDICINE

## 2020-06-16 RX ORDER — DULOXETIN HYDROCHLORIDE 60 MG/1
60 CAPSULE, DELAYED RELEASE ORAL DAILY
Qty: 30 CAPSULE | Refills: 1 | Status: SHIPPED | OUTPATIENT
Start: 2020-06-16 | End: 2020-08-14
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
## 2020-08-14 RX ORDER — DULOXETIN HYDROCHLORIDE 60 MG/1
CAPSULE, DELAYED RELEASE ORAL
Qty: 30 CAPSULE | Refills: 0 | Status: SHIPPED | OUTPATIENT
Start: 2020-08-14

## (undated) DEVICE — FORCEPS BX 240CM 2.4MM L NDL RAD JAW 4 M00513334

## (undated) DEVICE — SNARE ENDOSCP L240CM LOOP W13MM DIA2.4MM SHT THROW SM OVL